# Patient Record
Sex: FEMALE | Race: WHITE | Employment: OTHER | URBAN - NONMETROPOLITAN AREA
[De-identification: names, ages, dates, MRNs, and addresses within clinical notes are randomized per-mention and may not be internally consistent; named-entity substitution may affect disease eponyms.]

---

## 2017-12-05 LAB — MAMMOGRAPHY, EXTERNAL: NORMAL

## 2020-11-03 PROBLEM — I10 HYPERTENSION: Status: RESOLVED | Noted: 2020-11-03 | Resolved: 2020-11-03

## 2024-02-23 ENCOUNTER — OFFICE VISIT (OUTPATIENT)
Dept: FAMILY MEDICINE CLINIC | Age: 64
End: 2024-02-23
Payer: MEDICARE

## 2024-02-23 VITALS
WEIGHT: 161 LBS | DIASTOLIC BLOOD PRESSURE: 98 MMHG | TEMPERATURE: 97.1 F | HEART RATE: 83 BPM | RESPIRATION RATE: 18 BRPM | BODY MASS INDEX: 24.4 KG/M2 | HEIGHT: 68 IN | OXYGEN SATURATION: 95 % | SYSTOLIC BLOOD PRESSURE: 170 MMHG

## 2024-02-23 DIAGNOSIS — E78.00 HYPERCHOLESTEROLEMIA: ICD-10-CM

## 2024-02-23 DIAGNOSIS — R73.03 PREDIABETES: ICD-10-CM

## 2024-02-23 DIAGNOSIS — I10 ESSENTIAL HYPERTENSION: Primary | ICD-10-CM

## 2024-02-23 DIAGNOSIS — I10 ESSENTIAL HYPERTENSION: ICD-10-CM

## 2024-02-23 PROBLEM — E55.9 VITAMIN D DEFICIENCY: Status: ACTIVE | Noted: 2023-10-29

## 2024-02-23 LAB
ALBUMIN SERPL-MCNC: 4.4 G/DL (ref 3.5–5.2)
ALP BLD-CCNC: 62 U/L (ref 35–104)
ALT SERPL-CCNC: 38 U/L (ref 0–32)
ANION GAP SERPL CALCULATED.3IONS-SCNC: 14 MMOL/L (ref 7–16)
AST SERPL-CCNC: 35 U/L (ref 0–31)
BILIRUB SERPL-MCNC: 0.3 MG/DL (ref 0–1.2)
BUN BLDV-MCNC: 16 MG/DL (ref 6–23)
CALCIUM SERPL-MCNC: 9.6 MG/DL (ref 8.6–10.2)
CHLORIDE BLD-SCNC: 99 MMOL/L (ref 98–107)
CHOLESTEROL: 217 MG/DL
CO2: 24 MMOL/L (ref 22–29)
CREAT SERPL-MCNC: 0.7 MG/DL (ref 0.5–1)
GFR SERPL CREATININE-BSD FRML MDRD: >60 ML/MIN/1.73M2
GLUCOSE BLD-MCNC: 97 MG/DL (ref 74–99)
HBA1C MFR BLD: 5.6 % (ref 4–5.6)
HCT VFR BLD CALC: 42.7 % (ref 34–48)
HDLC SERPL-MCNC: 49 MG/DL
HEMOGLOBIN: 14.2 G/DL (ref 11.5–15.5)
LDL CHOLESTEROL: 139 MG/DL
MCH RBC QN AUTO: 32.9 PG (ref 26–35)
MCHC RBC AUTO-ENTMCNC: 33.3 G/DL (ref 32–34.5)
MCV RBC AUTO: 98.8 FL (ref 80–99.9)
PDW BLD-RTO: 12.6 % (ref 11.5–15)
PLATELET # BLD: 248 K/UL (ref 130–450)
PMV BLD AUTO: 11.4 FL (ref 7–12)
POTASSIUM SERPL-SCNC: 4.3 MMOL/L (ref 3.5–5)
RBC # BLD: 4.32 M/UL (ref 3.5–5.5)
SODIUM BLD-SCNC: 137 MMOL/L (ref 132–146)
TOTAL PROTEIN: 7.3 G/DL (ref 6.4–8.3)
TRIGL SERPL-MCNC: 143 MG/DL
VLDLC SERPL CALC-MCNC: 29 MG/DL
WBC # BLD: 6 K/UL (ref 4.5–11.5)

## 2024-02-23 PROCEDURE — 99204 OFFICE O/P NEW MOD 45 MIN: CPT | Performed by: STUDENT IN AN ORGANIZED HEALTH CARE EDUCATION/TRAINING PROGRAM

## 2024-02-23 PROCEDURE — 1123F ACP DISCUSS/DSCN MKR DOCD: CPT | Performed by: STUDENT IN AN ORGANIZED HEALTH CARE EDUCATION/TRAINING PROGRAM

## 2024-02-23 PROCEDURE — 3080F DIAST BP >= 90 MM HG: CPT | Performed by: STUDENT IN AN ORGANIZED HEALTH CARE EDUCATION/TRAINING PROGRAM

## 2024-02-23 PROCEDURE — 3077F SYST BP >= 140 MM HG: CPT | Performed by: STUDENT IN AN ORGANIZED HEALTH CARE EDUCATION/TRAINING PROGRAM

## 2024-02-23 RX ORDER — FLUTICASONE PROPIONATE 50 MCG
1 SPRAY, SUSPENSION (ML) NASAL DAILY
COMMUNITY

## 2024-02-23 RX ORDER — HYDROCHLOROTHIAZIDE 25 MG/1
25 TABLET ORAL EVERY MORNING
Qty: 30 TABLET | Refills: 1 | Status: SHIPPED | OUTPATIENT
Start: 2024-02-23

## 2024-02-23 RX ORDER — BENAZEPRIL/HYDROCHLOROTHIAZIDE 20 MG-25MG
TABLET ORAL
COMMUNITY
End: 2024-02-23

## 2024-02-23 RX ORDER — LOSARTAN POTASSIUM 50 MG/1
50 TABLET ORAL DAILY
Qty: 30 TABLET | Refills: 2 | Status: SHIPPED | OUTPATIENT
Start: 2024-02-23

## 2024-02-23 RX ORDER — CELECOXIB 100 MG/1
CAPSULE ORAL
COMMUNITY

## 2024-02-23 RX ORDER — EPINEPHRINE 0.15 MG/.15ML
INJECTION SUBCUTANEOUS
COMMUNITY
Start: 2016-09-29

## 2024-02-23 SDOH — ECONOMIC STABILITY: FOOD INSECURITY: WITHIN THE PAST 12 MONTHS, YOU WORRIED THAT YOUR FOOD WOULD RUN OUT BEFORE YOU GOT MONEY TO BUY MORE.: NEVER TRUE

## 2024-02-23 SDOH — ECONOMIC STABILITY: INCOME INSECURITY: HOW HARD IS IT FOR YOU TO PAY FOR THE VERY BASICS LIKE FOOD, HOUSING, MEDICAL CARE, AND HEATING?: NOT VERY HARD

## 2024-02-23 SDOH — ECONOMIC STABILITY: HOUSING INSECURITY
IN THE LAST 12 MONTHS, WAS THERE A TIME WHEN YOU DID NOT HAVE A STEADY PLACE TO SLEEP OR SLEPT IN A SHELTER (INCLUDING NOW)?: NO

## 2024-02-23 SDOH — ECONOMIC STABILITY: FOOD INSECURITY: WITHIN THE PAST 12 MONTHS, THE FOOD YOU BOUGHT JUST DIDN'T LAST AND YOU DIDN'T HAVE MONEY TO GET MORE.: NEVER TRUE

## 2024-02-23 ASSESSMENT — PATIENT HEALTH QUESTIONNAIRE - PHQ9
SUM OF ALL RESPONSES TO PHQ9 QUESTIONS 1 & 2: 0
SUM OF ALL RESPONSES TO PHQ QUESTIONS 1-9: 0
SUM OF ALL RESPONSES TO PHQ QUESTIONS 1-9: 0
2. FEELING DOWN, DEPRESSED OR HOPELESS: 0
SUM OF ALL RESPONSES TO PHQ QUESTIONS 1-9: 0
1. LITTLE INTEREST OR PLEASURE IN DOING THINGS: 0
SUM OF ALL RESPONSES TO PHQ QUESTIONS 1-9: 0

## 2024-02-23 ASSESSMENT — ANXIETY QUESTIONNAIRES
5. BEING SO RESTLESS THAT IT IS HARD TO SIT STILL: NOT AT ALL
6. BECOMING EASILY ANNOYED OR IRRITABLE: 0
6. BECOMING EASILY ANNOYED OR IRRITABLE: NOT AT ALL
4. TROUBLE RELAXING: NOT AT ALL
IF YOU CHECKED OFF ANY PROBLEMS ON THIS QUESTIONNAIRE, HOW DIFFICULT HAVE THESE PROBLEMS MADE IT FOR YOU TO DO YOUR WORK, TAKE CARE OF THINGS AT HOME, OR GET ALONG WITH OTHER PEOPLE: NOT DIFFICULT AT ALL
7. FEELING AFRAID AS IF SOMETHING AWFUL MIGHT HAPPEN: NOT AT ALL
1. FEELING NERVOUS, ANXIOUS, OR ON EDGE: 0
3. WORRYING TOO MUCH ABOUT DIFFERENT THINGS: NOT AT ALL
5. BEING SO RESTLESS THAT IT IS HARD TO SIT STILL: 0
2. NOT BEING ABLE TO STOP OR CONTROL WORRYING: NOT AT ALL
1. FEELING NERVOUS, ANXIOUS, OR ON EDGE: NOT AT ALL
7. FEELING AFRAID AS IF SOMETHING AWFUL MIGHT HAPPEN: 0
IF YOU CHECKED OFF ANY PROBLEMS ON THIS QUESTIONNAIRE, HOW DIFFICULT HAVE THESE PROBLEMS MADE IT FOR YOU TO DO YOUR WORK, TAKE CARE OF THINGS AT HOME, OR GET ALONG WITH OTHER PEOPLE: NOT DIFFICULT AT ALL
GAD7 TOTAL SCORE: 0
2. NOT BEING ABLE TO STOP OR CONTROL WORRYING: 0
3. WORRYING TOO MUCH ABOUT DIFFERENT THINGS: 0
4. TROUBLE RELAXING: 0

## 2024-02-23 ASSESSMENT — LIFESTYLE VARIABLES
HOW OFTEN DO YOU HAVE SIX OR MORE DRINKS ON ONE OCCASION: 1
HOW MANY STANDARD DRINKS CONTAINING ALCOHOL DO YOU HAVE ON A TYPICAL DAY: PATIENT DOES NOT DRINK
HOW OFTEN DO YOU HAVE A DRINK CONTAINING ALCOHOL: NEVER
HOW OFTEN DO YOU HAVE A DRINK CONTAINING ALCOHOL: 1
HOW MANY STANDARD DRINKS CONTAINING ALCOHOL DO YOU HAVE ON A TYPICAL DAY: 0

## 2024-02-23 NOTE — PROGRESS NOTES
[facility] [place] [facility] PRIMARY CARE  [address] [place] [address]  Dept: [phone]  Dept Fax: [phone]   DATE OF VISIT : 2024      Patient:  Carolann Gomes  Age: 63 y.o.       : 1960      Chief complaint:   Chief Complaint   Patient presents with    Establish [facility]    Allergic Reaction     BP medicine - taking for about 40 years, started with manageable hives/rash, but has gotten much worse          History of Present Illness     Carolann Gomse is a 63 y.o. female who presented to the clinic today to establish care.    Patient has a past medical history of hypertension, GERD, hyperlipidemia, and prediabetes.  She reports experiencing hives for almost 2 years now with a possibility from her blood pressure medication.  She reports previously being seen at urgent care for the symptoms and was first instructed to change all shampoos, lotions, detergents, creams, laundry detergents, etc.  Patient reports changing everything and continues to have all of the symptoms.  They then attempted to change her amlodipine-benazepril medication to benazepril-hydrochlorothiazide to no alleviation.  At this point patient continues to have diffuse erythematous rash.  She reports still having steroid cream that she applies intermittently when rash becomes significantly worse.  She denies rash becoming worse with any particular food intake and denies any shortness of breath, chest pain, or abdominal pain.  For her hyperlipidemia patient reports not having blood work done for some time.  She denies taking any medications for cholesterol.  Overall does report a well-balanced diet.  She additionally has medical records that states that she is prediabetic.  Hemoglobin A1c has never been checked.  Denies any headaches or dizziness.    Medication List:    Current Outpatient Medications   Medication Sig Dispense Refill    celecoxib (CELEBREX) 100 MG capsule Take 1

## 2024-02-28 DIAGNOSIS — E78.2 MIXED HYPERLIPIDEMIA: Primary | ICD-10-CM

## 2024-02-28 RX ORDER — ATORVASTATIN CALCIUM 20 MG/1
20 TABLET, FILM COATED ORAL DAILY
Qty: 90 TABLET | Refills: 1 | Status: SHIPPED | OUTPATIENT
Start: 2024-02-28

## 2024-03-08 ENCOUNTER — OFFICE VISIT (OUTPATIENT)
Dept: FAMILY MEDICINE CLINIC | Age: 64
End: 2024-03-08
Payer: MEDICARE

## 2024-03-08 VITALS
TEMPERATURE: 98.2 F | BODY MASS INDEX: 24.55 KG/M2 | SYSTOLIC BLOOD PRESSURE: 136 MMHG | OXYGEN SATURATION: 99 % | WEIGHT: 162 LBS | HEART RATE: 93 BPM | HEIGHT: 68 IN | DIASTOLIC BLOOD PRESSURE: 88 MMHG

## 2024-03-08 DIAGNOSIS — I10 ESSENTIAL HYPERTENSION: ICD-10-CM

## 2024-03-08 PROCEDURE — 1123F ACP DISCUSS/DSCN MKR DOCD: CPT | Performed by: STUDENT IN AN ORGANIZED HEALTH CARE EDUCATION/TRAINING PROGRAM

## 2024-03-08 PROCEDURE — 3079F DIAST BP 80-89 MM HG: CPT | Performed by: STUDENT IN AN ORGANIZED HEALTH CARE EDUCATION/TRAINING PROGRAM

## 2024-03-08 PROCEDURE — 3075F SYST BP GE 130 - 139MM HG: CPT | Performed by: STUDENT IN AN ORGANIZED HEALTH CARE EDUCATION/TRAINING PROGRAM

## 2024-03-08 PROCEDURE — 99213 OFFICE O/P EST LOW 20 MIN: CPT | Performed by: STUDENT IN AN ORGANIZED HEALTH CARE EDUCATION/TRAINING PROGRAM

## 2024-03-08 RX ORDER — LOSARTAN POTASSIUM 100 MG/1
100 TABLET ORAL DAILY
Qty: 30 TABLET | Refills: 2 | Status: SHIPPED | OUTPATIENT
Start: 2024-03-08

## 2024-03-18 ENCOUNTER — TELEPHONE (OUTPATIENT)
Dept: FAMILY MEDICINE CLINIC | Age: 64
End: 2024-03-18

## 2024-03-18 DIAGNOSIS — Z12.31 ENCOUNTER FOR SCREENING MAMMOGRAM FOR MALIGNANT NEOPLASM OF BREAST: Primary | ICD-10-CM

## 2024-03-18 RX ORDER — AMLODIPINE BESYLATE 5 MG/1
5 TABLET ORAL DAILY
Qty: 30 TABLET | Refills: 3 | Status: SHIPPED
Start: 2024-03-18 | End: 2024-03-19 | Stop reason: SDUPTHER

## 2024-03-18 NOTE — TELEPHONE ENCOUNTER
I called the patient to see if she would be interested in having a mammogram order placed and scheduled.  She said she would be interested in this and wants it scheduled for here. She said her last one was done at [facility]. Her last one was in 2017.    Can you place mammogram order?       I also requested that Eugenio fax us her last mammogram to [phone].

## 2024-03-18 NOTE — TELEPHONE ENCOUNTER
Pt is calling in for her losartan that she doesn't feel that it is working, 160/90 BP at home. She feels okay, but she wants to know if you need to raise her dose.

## 2024-03-19 RX ORDER — AMLODIPINE BESYLATE 5 MG/1
5 TABLET ORAL DAILY
Qty: 90 TABLET | Refills: 0 | Status: SHIPPED | OUTPATIENT
Start: 2024-03-19 | End: 2024-06-17

## 2024-04-05 ENCOUNTER — OFFICE VISIT (OUTPATIENT)
Dept: FAMILY MEDICINE CLINIC | Age: 64
End: 2024-04-05
Payer: MEDICARE

## 2024-04-05 VITALS
HEART RATE: 114 BPM | BODY MASS INDEX: 24.55 KG/M2 | TEMPERATURE: 98.1 F | OXYGEN SATURATION: 98 % | SYSTOLIC BLOOD PRESSURE: 138 MMHG | WEIGHT: 162 LBS | HEIGHT: 68 IN | DIASTOLIC BLOOD PRESSURE: 86 MMHG

## 2024-04-05 DIAGNOSIS — I10 ESSENTIAL HYPERTENSION: ICD-10-CM

## 2024-04-05 PROCEDURE — 3075F SYST BP GE 130 - 139MM HG: CPT | Performed by: STUDENT IN AN ORGANIZED HEALTH CARE EDUCATION/TRAINING PROGRAM

## 2024-04-05 PROCEDURE — 1123F ACP DISCUSS/DSCN MKR DOCD: CPT | Performed by: STUDENT IN AN ORGANIZED HEALTH CARE EDUCATION/TRAINING PROGRAM

## 2024-04-05 PROCEDURE — 99213 OFFICE O/P EST LOW 20 MIN: CPT | Performed by: STUDENT IN AN ORGANIZED HEALTH CARE EDUCATION/TRAINING PROGRAM

## 2024-04-05 PROCEDURE — 3079F DIAST BP 80-89 MM HG: CPT | Performed by: STUDENT IN AN ORGANIZED HEALTH CARE EDUCATION/TRAINING PROGRAM

## 2024-04-05 RX ORDER — LOSARTAN POTASSIUM 50 MG/1
50 TABLET ORAL DAILY
Qty: 30 TABLET | Refills: 4 | Status: SHIPPED | OUTPATIENT
Start: 2024-04-05

## 2024-04-05 RX ORDER — LOSARTAN POTASSIUM 50 MG/1
50 TABLET ORAL DAILY
Qty: 90 TABLET | Refills: 4 | OUTPATIENT
Start: 2024-04-05

## 2024-04-05 RX ORDER — CLONIDINE HYDROCHLORIDE 0.2 MG/1
0.2 TABLET ORAL 2 TIMES DAILY
Qty: 60 TABLET | Refills: 2 | Status: SHIPPED | OUTPATIENT
Start: 2024-04-05 | End: 2024-07-04

## 2024-04-05 NOTE — ASSESSMENT & PLAN NOTE
Stable at this time but requires medication changes due to headaches. Discontinue amlodipine. Continue losartan 50 mg daily.  Will add clonidine 0.2 mg twice daily.  Follow-up in 6 weeks for blood pressure check.  Advised to call if she begins to have any symptoms or worsening blood pressure.  Advised to check blood pressure on a daily basis.

## 2024-05-17 ENCOUNTER — OFFICE VISIT (OUTPATIENT)
Dept: FAMILY MEDICINE CLINIC | Age: 64
End: 2024-05-17
Payer: MEDICARE

## 2024-05-17 VITALS
OXYGEN SATURATION: 99 % | HEIGHT: 68 IN | SYSTOLIC BLOOD PRESSURE: 158 MMHG | DIASTOLIC BLOOD PRESSURE: 98 MMHG | WEIGHT: 165 LBS | RESPIRATION RATE: 18 BRPM | HEART RATE: 74 BPM | TEMPERATURE: 97.4 F | BODY MASS INDEX: 25.01 KG/M2

## 2024-05-17 DIAGNOSIS — Z91.030 BEE STING ALLERGY: ICD-10-CM

## 2024-05-17 DIAGNOSIS — I10 ESSENTIAL HYPERTENSION: Primary | ICD-10-CM

## 2024-05-17 PROCEDURE — 1123F ACP DISCUSS/DSCN MKR DOCD: CPT | Performed by: STUDENT IN AN ORGANIZED HEALTH CARE EDUCATION/TRAINING PROGRAM

## 2024-05-17 PROCEDURE — 99214 OFFICE O/P EST MOD 30 MIN: CPT | Performed by: STUDENT IN AN ORGANIZED HEALTH CARE EDUCATION/TRAINING PROGRAM

## 2024-05-17 PROCEDURE — 3080F DIAST BP >= 90 MM HG: CPT | Performed by: STUDENT IN AN ORGANIZED HEALTH CARE EDUCATION/TRAINING PROGRAM

## 2024-05-17 PROCEDURE — 3077F SYST BP >= 140 MM HG: CPT | Performed by: STUDENT IN AN ORGANIZED HEALTH CARE EDUCATION/TRAINING PROGRAM

## 2024-05-17 RX ORDER — LOSARTAN POTASSIUM 100 MG/1
100 TABLET ORAL DAILY
Qty: 30 TABLET | Refills: 5 | Status: SHIPPED | OUTPATIENT
Start: 2024-05-17 | End: 2024-11-13

## 2024-05-17 RX ORDER — EPINEPHRINE 0.3 MG/.3ML
0.3 INJECTION SUBCUTANEOUS ONCE AS NEEDED
Qty: 0.6 ML | Refills: 1 | Status: SHIPPED | OUTPATIENT
Start: 2024-05-17

## 2024-05-17 RX ORDER — EPINEPHRINE 0.15 MG/.15ML
INJECTION SUBCUTANEOUS
Status: CANCELLED | OUTPATIENT
Start: 2024-05-17

## 2024-05-17 RX ORDER — CELECOXIB 100 MG/1
CAPSULE ORAL
Qty: 60 CAPSULE | Refills: 2 | Status: SHIPPED | OUTPATIENT
Start: 2024-05-17

## 2024-05-17 NOTE — PROGRESS NOTES
[facility] [place] [facility] CARE  [address] [place] [address]  Dept: [phone]  Dept Fax: [phone]   DATE OF VISIT : 2024      Patient:  Carolann Gomes  Age: 63 y.o.       : 1960      Chief complaint:   Chief Complaint   Patient presents with    Hypertension         History of Present Illness     Carolann Gomes is a 63 y.o. female who presented to the clinic today for hypertension    Patient presents today for hypertension follow-up.  She has had several reactions to different medications in the past including  amlodipine-benazepril, benazepril - hydrochlorothiazide.  Patient was then attempted to switch to losartan and was discontinued from HCTZ.  Previous reactions from HCTZ includes hives.  Patient was unable to tolerate losartan 100 mg and was then decreased to 50 mg.  She was then given amlodipine but was unable to tolerate it due to neck stiffness and headaches.  She denies any nausea, emesis, chest pain or shortness of breath.    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    Medication List:    Current Outpatient Medications   Medication Sig Dispense Refill    celecoxib (CELEBREX) 100 MG capsule Take 1 capsule every day by oral route for 30 days. 60 capsule 2    losartan (COZAAR) 100 MG tablet Take 1 tablet by mouth daily 30 tablet 5    EPINEPHrine (EPIPEN 2-KELSEY) 0.3 MG/0.3ML SOAJ injection Inject 0.3 mLs into the muscle 1 (one) time if needed (allergic reaction to bee.) Use as directed for allergic reaction 0.6 mL 1    cloNIDine (CATAPRES) 0.2 MG tablet Take 1 tablet by mouth 2 times daily 60 tablet 2    atorvastatin (LIPITOR) 20 MG tablet Take 1 tablet by mouth daily 90 tablet 1    fluticasone (FLONASE) 50 MCG/ACT nasal spray 1 spray by Each Nostril route daily      vitamin D (VITAMIN D3) 250 MCG (79708 UT) CAPS capsule Take 1 capsule by mouth daily      NONFORMULARY

## 2024-05-29 DIAGNOSIS — E78.2 MIXED HYPERLIPIDEMIA: ICD-10-CM

## 2024-05-30 RX ORDER — ATORVASTATIN CALCIUM 20 MG/1
20 TABLET, FILM COATED ORAL DAILY
Qty: 90 TABLET | Refills: 1 | Status: SHIPPED | OUTPATIENT
Start: 2024-05-30

## 2024-05-30 NOTE — TELEPHONE ENCOUNTER
Last Appointment:  5/17/2024    Future appts:  Future Appointments   Date Time Provider Department Center   7/17/2024 10:15 AM Allan Layne MD [facility]

## 2024-06-24 DIAGNOSIS — I10 ESSENTIAL HYPERTENSION: ICD-10-CM

## 2024-06-24 NOTE — TELEPHONE ENCOUNTER
Last Appointment:  5/17/2024  Future Appointments   Date Time Provider Department Center   7/17/2024 10:15 [facility]       Yes...

## 2024-06-25 RX ORDER — CLONIDINE HYDROCHLORIDE 0.2 MG/1
0.2 TABLET ORAL 2 TIMES DAILY
Qty: 180 TABLET | Refills: 3 | Status: SHIPPED | OUTPATIENT
Start: 2024-06-25

## 2024-06-27 ENCOUNTER — OFFICE VISIT (OUTPATIENT)
Dept: FAMILY MEDICINE CLINIC | Age: 64
End: 2024-06-27
Payer: MEDICARE

## 2024-06-27 VITALS
WEIGHT: 164 LBS | TEMPERATURE: 97.8 F | HEIGHT: 68 IN | OXYGEN SATURATION: 100 % | RESPIRATION RATE: 16 BRPM | HEART RATE: 86 BPM | BODY MASS INDEX: 24.86 KG/M2 | SYSTOLIC BLOOD PRESSURE: 134 MMHG | DIASTOLIC BLOOD PRESSURE: 80 MMHG

## 2024-06-27 DIAGNOSIS — R07.81 RIB PAIN ON LEFT SIDE: Primary | ICD-10-CM

## 2024-06-27 PROCEDURE — 1123F ACP DISCUSS/DSCN MKR DOCD: CPT | Performed by: PHYSICIAN ASSISTANT

## 2024-06-27 PROCEDURE — 99213 OFFICE O/P EST LOW 20 MIN: CPT | Performed by: PHYSICIAN ASSISTANT

## 2024-06-27 PROCEDURE — 3075F SYST BP GE 130 - 139MM HG: CPT | Performed by: PHYSICIAN ASSISTANT

## 2024-06-27 PROCEDURE — 3079F DIAST BP 80-89 MM HG: CPT | Performed by: PHYSICIAN ASSISTANT

## 2024-06-27 NOTE — PROGRESS NOTES
Chief Complaint   Fall (L sided rib pain, fell on monday)      History of Present Illness   Source of history provided by:  patient.      Carolann Gomes is a 63 y.o. old female presenting to the walk in clinic for evaluation of left lower anterior rib pain which has been present for the past 4 days.  Patient reports that she fell forward onto grass which precipitated her symptoms.  She denies hitting her head during this fall.  Denies any LOC.  Patient reports that the pain is exacerbated with direct palpation of the site as well as deep breathing.  Denies any associated chest pain or dyspnea.  Denies any abdominal pain, hematuria, N/V/D, fever, chills, HA, neck pain, recent illness, dysuria, or lethargy.    ROS    Unless otherwise stated in this report or unable to obtain because of the patient's clinical or mental status as evidenced by the medical record, this patients's positive and negative responses for Review of Systems, constitutional, psych, eyes, ENT, cardiovascular, respiratory, gastrointestinal, neurological, genitourinary, musculoskeletal, integument systems and systems related to the presenting problem are either stated in the preceding or were not pertinent or were negative for the symptoms and/or complaints related to the medical problem.    Past Medical History:  has a past medical history of GERD (gastroesophageal reflux disease) and Hypertension.  Past Surgical History:  has a past surgical history that includes Cholecystectomy; back surgery; and  section.  Social History:  reports that she quit smoking about 38 years ago. Her smoking use included cigarettes. She started smoking about 58 years ago. She has a 60.0 pack-year smoking history. She has never used smokeless tobacco. She reports that she does not currently use alcohol. She reports current drug use. Drug: Marijuana (Weed).  Family History: family history includes Breast Cancer in her paternal aunt; Cancer (age of onset:

## 2024-07-17 ENCOUNTER — OFFICE VISIT (OUTPATIENT)
Dept: FAMILY MEDICINE CLINIC | Age: 64
End: 2024-07-17
Payer: MEDICARE

## 2024-07-17 VITALS
OXYGEN SATURATION: 99 % | BODY MASS INDEX: 24.71 KG/M2 | HEART RATE: 79 BPM | TEMPERATURE: 97.6 F | RESPIRATION RATE: 18 BRPM | HEIGHT: 68 IN | WEIGHT: 163 LBS | SYSTOLIC BLOOD PRESSURE: 178 MMHG | DIASTOLIC BLOOD PRESSURE: 108 MMHG

## 2024-07-17 DIAGNOSIS — I10 ESSENTIAL HYPERTENSION: Primary | ICD-10-CM

## 2024-07-17 PROCEDURE — 1123F ACP DISCUSS/DSCN MKR DOCD: CPT | Performed by: STUDENT IN AN ORGANIZED HEALTH CARE EDUCATION/TRAINING PROGRAM

## 2024-07-17 PROCEDURE — 3077F SYST BP >= 140 MM HG: CPT | Performed by: STUDENT IN AN ORGANIZED HEALTH CARE EDUCATION/TRAINING PROGRAM

## 2024-07-17 PROCEDURE — 99213 OFFICE O/P EST LOW 20 MIN: CPT | Performed by: STUDENT IN AN ORGANIZED HEALTH CARE EDUCATION/TRAINING PROGRAM

## 2024-07-17 PROCEDURE — 3080F DIAST BP >= 90 MM HG: CPT | Performed by: STUDENT IN AN ORGANIZED HEALTH CARE EDUCATION/TRAINING PROGRAM

## 2024-07-17 RX ORDER — METOPROLOL SUCCINATE 50 MG/1
50 TABLET, EXTENDED RELEASE ORAL DAILY
Qty: 90 TABLET | Refills: 1 | Status: SHIPPED | OUTPATIENT
Start: 2024-07-17

## 2024-07-17 NOTE — PROGRESS NOTES
capsule by mouth daily. 30 capsule 0     No current facility-administered medications for this visit.            ROS   Reviewed as above, otherwise negative       Physical Exam   Vitals:   Vitals:    07/17/24 1026   BP: (!) 178/108   Pulse:    Resp:    Temp:    SpO2:        Physical Exam  Vitals reviewed.   Constitutional:       Appearance: Normal appearance.   HENT:      Head: Normocephalic and atraumatic.   Cardiovascular:      Rate and Rhythm: Normal rate and regular rhythm.      Pulses: Normal pulses.      Heart sounds: Normal heart sounds.   Pulmonary:      Effort: Pulmonary effort is normal.      Breath sounds: Normal breath sounds.   Neurological:      Mental Status: She is alert.   Psychiatric:         Mood and Affect: Mood normal.         Behavior: Behavior normal.           Assessment and Plan       1. Essential hypertension  Comments:  Unstable.  Discontinue clonidine.  Will add metoprolol 50 mg daily and continue losartan 100 mg daily.  Orders:  -     metoprolol succinate (TOPROL XL) 50 MG extended release tablet; Take 1 tablet by mouth daily, Disp-90 tablet, R-1Normal       Return in about 6 weeks (around 8/28/2024) for Hypertension.    This note may have been created using dictation software. Efforts were made to reduce grammatical or syntax errors, but some may persist.     Counseled regarding above diagnosis, including possible risks and complications, especially if left uncontrolled. Counseled regarding the possible side effects, risks, benefits and alternatives to treatment; patient and/or guardian verbalizes understanding, agrees, feels comfortable with, and wishes to proceed with above treatment plan.     Call or go to ED immediately if symptoms worsen or persist. Advised patient to call with any new medication issues and, as applicable, read all Rx info from pharmacy to assure aware of all possible risks and side effects of medication before taking.     Patient and/or guardian given opportunity

## 2024-07-22 ENCOUNTER — TELEPHONE (OUTPATIENT)
Dept: FAMILY MEDICINE CLINIC | Age: 64
End: 2024-07-22

## 2024-07-22 NOTE — TELEPHONE ENCOUNTER
Carolann is calling about the Metoprolol.  She is stopping it today because of the side effects.    She has swollen feet and a stiff neck. It gave her a bad headache and hives on her upper body.

## 2024-07-23 ENCOUNTER — TELEPHONE (OUTPATIENT)
Dept: FAMILY MEDICINE CLINIC | Age: 64
End: 2024-07-23

## 2024-07-23 NOTE — TELEPHONE ENCOUNTER
Patient called in again today and was wondering what you would like her to do as far as the metoprolol goes? She called in yesterday and stated that she stopped medication due to side effects. She had swollen feet and a stiff neck and hives on upper body.

## 2024-07-23 NOTE — TELEPHONE ENCOUNTER
Please advise patient to seek medical attention at nearest emergency room given her symptoms to appear severe which include stiff neck and hives.  We can discuss adding additional medications such as hydralazine once she recovers from her acute allergic reaction.

## 2024-07-23 NOTE — TELEPHONE ENCOUNTER
Patient was informed. She said she hasn't taken the medication fr 2 days. She said the hives, swollen feet, and stiff neck are subsiding. She said they are almost gone. Sunday she took it and it was 168/127. Her blood pressure has come down some its still on the higher side but its come down since stopping the medication.

## 2024-08-08 DIAGNOSIS — I10 ESSENTIAL HYPERTENSION: ICD-10-CM

## 2024-08-08 RX ORDER — LOSARTAN POTASSIUM 100 MG/1
100 TABLET ORAL DAILY
Qty: 30 TABLET | Refills: 5 | Status: SHIPPED | OUTPATIENT
Start: 2024-08-08 | End: 2025-02-04

## 2024-08-28 ENCOUNTER — OFFICE VISIT (OUTPATIENT)
Dept: FAMILY MEDICINE CLINIC | Age: 64
End: 2024-08-28
Payer: MEDICARE

## 2024-08-28 VITALS
TEMPERATURE: 97.7 F | WEIGHT: 163.6 LBS | HEIGHT: 68 IN | OXYGEN SATURATION: 97 % | DIASTOLIC BLOOD PRESSURE: 90 MMHG | SYSTOLIC BLOOD PRESSURE: 142 MMHG | HEART RATE: 85 BPM | BODY MASS INDEX: 24.8 KG/M2

## 2024-08-28 DIAGNOSIS — E78.2 MIXED HYPERLIPIDEMIA: ICD-10-CM

## 2024-08-28 DIAGNOSIS — I10 ESSENTIAL HYPERTENSION: Primary | ICD-10-CM

## 2024-08-28 PROCEDURE — 1123F ACP DISCUSS/DSCN MKR DOCD: CPT | Performed by: STUDENT IN AN ORGANIZED HEALTH CARE EDUCATION/TRAINING PROGRAM

## 2024-08-28 PROCEDURE — 3080F DIAST BP >= 90 MM HG: CPT | Performed by: STUDENT IN AN ORGANIZED HEALTH CARE EDUCATION/TRAINING PROGRAM

## 2024-08-28 PROCEDURE — 99214 OFFICE O/P EST MOD 30 MIN: CPT | Performed by: STUDENT IN AN ORGANIZED HEALTH CARE EDUCATION/TRAINING PROGRAM

## 2024-08-28 PROCEDURE — 3077F SYST BP >= 140 MM HG: CPT | Performed by: STUDENT IN AN ORGANIZED HEALTH CARE EDUCATION/TRAINING PROGRAM

## 2024-08-28 RX ORDER — ATORVASTATIN CALCIUM 20 MG/1
20 TABLET, FILM COATED ORAL DAILY
Qty: 90 TABLET | Refills: 1 | Status: SHIPPED | OUTPATIENT
Start: 2024-08-28

## 2024-08-28 RX ORDER — CLONIDINE HYDROCHLORIDE 0.2 MG/1
0.2 TABLET ORAL 3 TIMES DAILY
Qty: 270 TABLET | Refills: 1 | Status: SHIPPED | OUTPATIENT
Start: 2024-08-28 | End: 2025-02-24

## 2024-08-28 NOTE — PROGRESS NOTES
[facility] [place] [facility] PRIMARY CARE  [address] [place] [address]  Dept: [phone]  Dept Fax: [phone]   DATE OF VISIT : 2024      Patient:  Carolann Gomes  Age: 64 y.o.       : 1960      Chief complaint:   Chief Complaint   Patient presents with    Follow-up         History of Present Illness     Carolann Gomes is a 64 y.o. female who presented to the clinic today for hypertension    Since her last office visit patient has continued to take her losartan and has restarted her clonidine 0.2 mg but 3 times daily.  She denies any worsening somnolence.  She denies any headaches, chest pain or shortness of breath.  Patient has tolerated medication well reports blood pressure decreasing since restarting her medications.    Additionally patient continues to take atorvastatin as prescribed.  She denies any significant weight changes since her last office visit.  Does continue to have well-balanced diet.    Medication List:    Current Outpatient Medications   Medication Sig Dispense Refill    cloNIDine (CATAPRES) 0.2 MG tablet Take 1 tablet by mouth in the morning, at noon, and at bedtime 270 tablet 1    atorvastatin (LIPITOR) 20 MG tablet Take 1 tablet by mouth daily 90 tablet 1    losartan (COZAAR) 100 MG tablet Take 1 tablet by mouth daily 30 tablet 5    celecoxib (CELEBREX) 100 MG capsule Take 1 capsule every day by oral route for 30 days. 60 capsule 2    EPINEPHrine (EPIPEN 2-KELSEY) 0.3 MG/0.3ML SOAJ injection Inject 0.3 mLs into the muscle 1 (one) time if needed (allergic reaction to bee.) Use as directed for allergic reaction 0.6 mL 1    fluticasone (FLONASE) 50 MCG/ACT nasal spray 1 spray by Each Nostril route daily      vitamin D (VITAMIN D3) 250 MCG (43255 UT) CAPS capsule Take 1 capsule by mouth daily      EPINEPHrine (ADRENACLICK) 0.15 MG/0.15ML SOAJ injection       omeprazole (PRILOSEC) 40 MG capsule Take 1 capsule by mouth daily. 30

## 2024-12-11 ENCOUNTER — OFFICE VISIT (OUTPATIENT)
Dept: FAMILY MEDICINE CLINIC | Age: 64
End: 2024-12-11

## 2024-12-11 VITALS
SYSTOLIC BLOOD PRESSURE: 130 MMHG | TEMPERATURE: 97.3 F | HEIGHT: 68 IN | DIASTOLIC BLOOD PRESSURE: 86 MMHG | WEIGHT: 166 LBS | BODY MASS INDEX: 25.16 KG/M2 | OXYGEN SATURATION: 97 % | HEART RATE: 83 BPM

## 2024-12-11 DIAGNOSIS — M25.511 CHRONIC RIGHT SHOULDER PAIN: ICD-10-CM

## 2024-12-11 DIAGNOSIS — M25.511 CHRONIC RIGHT SHOULDER PAIN: Primary | ICD-10-CM

## 2024-12-11 DIAGNOSIS — Z12.11 COLON CANCER SCREENING: ICD-10-CM

## 2024-12-11 DIAGNOSIS — G89.29 CHRONIC RIGHT SHOULDER PAIN: Primary | ICD-10-CM

## 2024-12-11 DIAGNOSIS — G89.29 CHRONIC RIGHT SHOULDER PAIN: ICD-10-CM

## 2024-12-11 DIAGNOSIS — Z23 NEED FOR IMMUNIZATION AGAINST INFLUENZA: Primary | ICD-10-CM

## 2024-12-11 DIAGNOSIS — I10 ESSENTIAL HYPERTENSION: ICD-10-CM

## 2024-12-11 RX ORDER — BACLOFEN 10 MG/1
10 TABLET ORAL 2 TIMES DAILY
Qty: 30 TABLET | Refills: 2 | Status: SHIPPED
Start: 2024-12-11 | End: 2024-12-11

## 2024-12-11 RX ORDER — BACLOFEN 10 MG/1
10 TABLET ORAL 2 TIMES DAILY
Qty: 30 TABLET | Refills: 2 | Status: SHIPPED | OUTPATIENT
Start: 2024-12-11

## 2024-12-11 NOTE — PROGRESS NOTES
[facility] CARE  [address] [place] [address]  Dept: [phone]  Dept Fax: [phone]   DATE OF VISIT : 2024      Patient:  Carolann Gomes  Age: 64 y.o.       : 1960      Chief complaint:   Chief Complaint   Patient presents with    Follow-up     HTN    Shoulder Injury     right    Pain         History of Present Illness     Carolann Gomes is a 64 y.o. female who presented to the clinic today for right shoulder pain and hypertension    Patient's overall blood pressure appears to be well-controlled on clonidine and losartan.  She denies any headaches, dizziness or blurry vision.  Additionally patient does continue to have right shoulder pain and discomfort.  Reports having a fall back in 2024.  Has not noticed any improvement in overall pain.  Now is experiencing weakness of her right upper extremity.  At times feels that she is unable to grasp items.  Denies any shooting pain down her arm.  Pain mainly localized at the right shoulder.    Medication List:    Current Outpatient Medications   Medication Sig Dispense Refill    baclofen (LIORESAL) 10 MG tablet Take 1 tablet by mouth 2 times daily 30 tablet 2    cloNIDine (CATAPRES) 0.2 MG tablet Take 1 tablet by mouth in the morning, at noon, and at bedtime 270 tablet 1    atorvastatin (LIPITOR) 20 MG tablet Take 1 tablet by mouth daily 90 tablet 1    losartan (COZAAR) 100 MG tablet Take 1 tablet by mouth daily 30 tablet 5    EPINEPHrine (EPIPEN 2-KELSEY) 0.3 MG/0.3ML SOAJ injection Inject 0.3 mLs into the muscle 1 (one) time if needed (allergic reaction to bee.) Use as directed for allergic reaction 0.6 mL 1    fluticasone (FLONASE) 50 MCG/ACT nasal spray 1 spray by Each Nostril route daily      vitamin D (VITAMIN D3) 250 MCG (30770 UT) CAPS capsule Take 1 capsule by mouth daily      omeprazole (PRILOSEC) 40 MG capsule Take 1 capsule by mouth daily. 30 capsule 0     No

## 2025-01-02 DIAGNOSIS — E78.2 MIXED HYPERLIPIDEMIA: ICD-10-CM

## 2025-01-02 DIAGNOSIS — M25.511 CHRONIC RIGHT SHOULDER PAIN: ICD-10-CM

## 2025-01-02 DIAGNOSIS — G89.29 CHRONIC RIGHT SHOULDER PAIN: ICD-10-CM

## 2025-01-02 DIAGNOSIS — I10 ESSENTIAL HYPERTENSION: ICD-10-CM

## 2025-01-02 NOTE — TELEPHONE ENCOUNTER
Patient's insurance has changed.  She needs new Rx's sent to [facility] in [place].  Orders pending.     Last Appointment:  12/11/2024  Future Appointments   Date Time Provider Department Center   1/10/2025  4:15 PM [facility]   2/14/2025  9:15 AM Swapnil Jones MD [facility]   3/12/2025 10:15 AM Allan Layne MD [facility] DEP

## 2025-01-03 RX ORDER — BACLOFEN 10 MG/1
10 TABLET ORAL 2 TIMES DAILY
Qty: 30 TABLET | Refills: 2 | Status: SHIPPED | OUTPATIENT
Start: 2025-01-03

## 2025-01-03 RX ORDER — ATORVASTATIN CALCIUM 20 MG/1
20 TABLET, FILM COATED ORAL DAILY
Qty: 90 TABLET | Refills: 1 | Status: SHIPPED | OUTPATIENT
Start: 2025-01-03

## 2025-01-03 RX ORDER — CLONIDINE HYDROCHLORIDE 0.2 MG/1
0.2 TABLET ORAL 3 TIMES DAILY
Qty: 270 TABLET | Refills: 1 | Status: SHIPPED | OUTPATIENT
Start: 2025-01-03 | End: 2025-07-02

## 2025-01-03 RX ORDER — LOSARTAN POTASSIUM 100 MG/1
100 TABLET ORAL DAILY
Qty: 30 TABLET | Refills: 5 | Status: SHIPPED | OUTPATIENT
Start: 2025-01-03 | End: 2025-07-02

## 2025-01-10 ENCOUNTER — HOSPITAL ENCOUNTER (OUTPATIENT)
Dept: MRI IMAGING | Age: 65
Discharge: HOME | End: 2025-01-12
Payer: MEDICARE

## 2025-01-10 DIAGNOSIS — M25.511 CHRONIC RIGHT SHOULDER PAIN: ICD-10-CM

## 2025-01-10 DIAGNOSIS — G89.29 CHRONIC RIGHT SHOULDER PAIN: ICD-10-CM

## 2025-01-10 PROCEDURE — 73221 MRI JOINT UPR EXTREM W/O DYE: CPT

## 2025-01-13 DIAGNOSIS — M75.101 TEAR OF RIGHT SUPRASPINATUS TENDON: Primary | ICD-10-CM

## 2025-02-14 ENCOUNTER — OFFICE VISIT (OUTPATIENT)
Dept: SURGERY | Age: 65
End: 2025-02-14

## 2025-02-14 VITALS
HEIGHT: 68 IN | OXYGEN SATURATION: 98 % | BODY MASS INDEX: 25.01 KG/M2 | SYSTOLIC BLOOD PRESSURE: 132 MMHG | RESPIRATION RATE: 18 BRPM | WEIGHT: 165 LBS | HEART RATE: 71 BPM | DIASTOLIC BLOOD PRESSURE: 88 MMHG

## 2025-02-14 DIAGNOSIS — Z86.0100 HISTORY OF COLON POLYPS: Primary | ICD-10-CM

## 2025-02-18 NOTE — PROGRESS NOTES
[facility] Surgery Clinic Note    Assessment & Plan  1. Screening colonoscopy.  A comprehensive discussion was held regarding the potential risks associated with the procedure, including the possibility of bleeding, perforation, and the need for surgical intervention in rare cases. She was informed that any polyps found would be removed and biopsied if necessary.     PROCEDURE  The patient underwent a colonoscopy in , which revealed the presence of polyps.    Return for Colonoscopy.    Carolann was seen today for new patient.    Diagnoses and all orders for this visit:    History of colon polyps           Chief Complaint   Patient presents with    New Patient     Colon screen (Ref Dr Layne)         PCP: Allan Layne MD  CC: Allan Layne MD     Carolann Gomes is a 64 y.o. female.    History of Present Illness  The patient presents for a colonoscopy.    She was referred for a colonoscopy, having not undergone the procedure since . She reports no current issues with bowel movements and has not observed any blood in her stool. A stool test has not been conducted. She is not experiencing abdominal pain or unexplained weight loss. Her medical history includes cholecystectomy and four  sections. Her previous colonoscopy revealed the presence of polyps.    FAMILY HISTORY  She does not have a family history of Crohn's disease, ulcerative colitis, or colon cancer.        Results      Past Medical History:   Diagnosis Date    GERD (gastroesophageal reflux disease)     Hypertension        Past Surgical History:   Procedure Laterality Date    BACK SURGERY       SECTION      CHOLECYSTECTOMY         Prior to Admission medications    Medication Sig Start Date End Date Taking? Authorizing Provider   atorvastatin (LIPITOR) 20 MG tablet Take 1 tablet by mouth daily 1/3/25  Yes Allan Layne MD   baclofen (LIORESAL) 10 MG tablet Take 1 tablet by mouth 2 times daily 1/3/25  Yes Roverto 
Hpi Title: Evaluation of Skin Lesions
How Severe Are Your Spot(S)?: moderate
Have Your Spot(S) Been Treated In The Past?: has not been treated

## 2025-02-19 ENCOUNTER — OFFICE VISIT (OUTPATIENT)
Dept: ORTHOPEDIC SURGERY | Age: 65
End: 2025-02-19

## 2025-02-19 VITALS
OXYGEN SATURATION: 96 % | SYSTOLIC BLOOD PRESSURE: 140 MMHG | DIASTOLIC BLOOD PRESSURE: 90 MMHG | WEIGHT: 164 LBS | TEMPERATURE: 98.1 F | BODY MASS INDEX: 24.86 KG/M2 | HEIGHT: 68 IN | RESPIRATION RATE: 18 BRPM | HEART RATE: 92 BPM

## 2025-02-19 DIAGNOSIS — M25.511 RIGHT SHOULDER PAIN, UNSPECIFIED CHRONICITY: Primary | ICD-10-CM

## 2025-02-19 NOTE — PROGRESS NOTES
[facility]  ORTHOPAEDICS   DATE OF VISIT: 02/19/25  New  Patient     Referring Provider:   Allan Layne MD  [address] [place] [address]    CHIEF COMPLAINT:   Chief Complaint   Patient presents with    Shoulder Pain     Right shoulder --  states was cutting grass and bag fell off push mower and landed on shoulder fully extended -- weakness, Rt handed -- no formal treatment     Results     Right shoulder MRI: Near full-thickness interstitial and articular-surface tearing of mid  Supraspinatus, partial-thickness interstitial and  articular-surface tearing of infraspinatus and remainder of supraspinatus  between musculotendinous junction and footplate. Moderate underlying infraspinatus tendinosis.  Moderate to severe underlying supraspinatus tendinosis. // Up to 50% partial-thickness interstitial and articular surface tearing of  the insertional fibers of subscapularis.          HPI:      History of Present Illness  The patient presents for evaluation of right shoulder pain.    She sustained a fall in 07/2024, resulting in significant bruising to her ribs. Initially, she was uncertain about the extent of her shoulder injury due to the overall soreness from the fall. However, by 08/2024 or 09/2024, she began to experience weakness in her right shoulder, which is her dominant side. This weakness manifested as an inability to maintain  on objects, leading to frequent dropping. 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 Additionally, she experienced pain in her right shoulder, particularly when sleeping on it. Despite these symptoms, she has not sought any formal therapy or treatment. 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 She has been performing small exercises to preserve flexibility and prevent freezing of the

## 2025-02-19 NOTE — PROGRESS NOTES
Patient blood pressure was taken twice, 140 / 100 + 140 / 90. Patient states in pain and gets anxious at providers office. Denies any symptoms or issues, has taken BP medication. Instructed to keep an eye on BP and let PCP know if continues or if they starts having blurred/double vision or horrible headaches to seek medical attention.

## 2025-02-20 ENCOUNTER — TELEPHONE (OUTPATIENT)
Dept: ORTHOPEDIC SURGERY | Age: 65
End: 2025-02-20

## 2025-02-20 DIAGNOSIS — M12.811 ROTATOR CUFF TEAR ARTHROPATHY OF RIGHT SHOULDER: Primary | ICD-10-CM

## 2025-02-20 DIAGNOSIS — M75.101 ROTATOR CUFF TEAR ARTHROPATHY OF RIGHT SHOULDER: Primary | ICD-10-CM

## 2025-02-20 NOTE — TELEPHONE ENCOUNTER
[facility]  ORTHOPAEDIC SURGERY SCHEDULING NOTE    Patient wishes to proceed with surgery after risks and benefits conversation with Dr. Galeas.     Surgical education was discussed with the patient and a surgical handout was given. Educated patient that pre-admission testing will be contacting them regarding further surgical instructions. Notified that if they are having a joint replacement, they will be scheduled for a mandatory education class. Pre/post-op appointments were made as needed.     Patient is also aware to obtain any clearances prior to surgery.   Clearances required: Medical      Insurance: [employer]    *Authorization details can be found attached to the referral*     Surgery: R SHOULDER, RCR, BICEPS TENODESIS   OR DATE: 3/20  Vendor: [facility]  Block: ISB  CPT: 54396 + 72720  DX: M75.121   Special Needs (if applicable): NA

## 2025-02-24 ENCOUNTER — TELEPHONE (OUTPATIENT)
Dept: ORTHOPEDIC SURGERY | Age: 65
End: 2025-02-24

## 2025-02-25 ENCOUNTER — PREP FOR PROCEDURE (OUTPATIENT)
Dept: SURGERY | Age: 65
End: 2025-02-25

## 2025-02-25 ENCOUNTER — TELEPHONE (OUTPATIENT)
Dept: SURGERY | Age: 65
End: 2025-02-25

## 2025-02-25 DIAGNOSIS — Z86.0100 HX OF COLONIC POLYP: ICD-10-CM

## 2025-02-25 NOTE — TELEPHONE ENCOUNTER
Carolann Gomes is scheduled for colonoscopy with Dr Jones on 06-30-25 at [facility]. Patient needs to be NPO after midnight the night before procedure. All surgery instructions were explained to the patient and a surgery letter was also mailed out. MA informed patient that PAT will also be calling to review pre-op instructions and medications. Patient verbalized understanding.  Electronically signed by Isis Parra MA on 2/25/2025 at 10:01 AM

## 2025-02-27 NOTE — TELEPHONE ENCOUNTER
Josr Silvestre ([phone]) Josiane - ref # [id number] - Peer to Peer - Nurse - Josiane - provided information below as well as has been taking anti-inflammatories since Justina PRN.    Prior authorization obtained:   Auth# [id number]  3/20/25 thru 6/17/25

## 2025-03-12 ENCOUNTER — OFFICE VISIT (OUTPATIENT)
Dept: FAMILY MEDICINE CLINIC | Age: 65
End: 2025-03-12
Payer: MEDICARE

## 2025-03-12 VITALS
SYSTOLIC BLOOD PRESSURE: 130 MMHG | BODY MASS INDEX: 25.31 KG/M2 | OXYGEN SATURATION: 99 % | HEART RATE: 75 BPM | DIASTOLIC BLOOD PRESSURE: 72 MMHG | WEIGHT: 167 LBS | HEIGHT: 68 IN | TEMPERATURE: 97.3 F

## 2025-03-12 DIAGNOSIS — I10 ESSENTIAL HYPERTENSION: ICD-10-CM

## 2025-03-12 DIAGNOSIS — E78.2 MIXED HYPERLIPIDEMIA: ICD-10-CM

## 2025-03-12 DIAGNOSIS — R73.03 PREDIABETES: ICD-10-CM

## 2025-03-12 DIAGNOSIS — S46.011S TRAUMATIC INCOMPLETE TEAR OF RIGHT ROTATOR CUFF, SEQUELA: Primary | ICD-10-CM

## 2025-03-12 LAB
ANION GAP SERPL CALCULATED.3IONS-SCNC: 18 MMOL/L (ref 7–16)
BUN BLDV-MCNC: 17 MG/DL (ref 6–23)
CALCIUM SERPL-MCNC: 9.4 MG/DL (ref 8.6–10.2)
CHLORIDE BLD-SCNC: 102 MMOL/L (ref 98–107)
CHOLESTEROL, TOTAL: 135 MG/DL
CO2: 19 MMOL/L (ref 22–29)
CREAT SERPL-MCNC: 0.7 MG/DL (ref 0.5–1)
GFR, ESTIMATED: >90 ML/MIN/1.73M2
GLUCOSE BLD-MCNC: 106 MG/DL (ref 74–99)
HBA1C MFR BLD: 5.8 % (ref 4–5.6)
HCT VFR BLD CALC: 40.8 % (ref 34–48)
HDLC SERPL-MCNC: 56 MG/DL
HEMOGLOBIN: 13 G/DL (ref 11.5–15.5)
LDL CHOLESTEROL: 63 MG/DL
MCH RBC QN AUTO: 32.2 PG (ref 26–35)
MCHC RBC AUTO-ENTMCNC: 31.9 G/DL (ref 32–34.5)
MCV RBC AUTO: 101 FL (ref 80–99.9)
PDW BLD-RTO: 12.9 % (ref 11.5–15)
PLATELET # BLD: 205 K/UL (ref 130–450)
PMV BLD AUTO: 12.6 FL (ref 7–12)
POTASSIUM SERPL-SCNC: 5.1 MMOL/L (ref 3.5–5)
RBC # BLD: 4.04 M/UL (ref 3.5–5.5)
SODIUM BLD-SCNC: 139 MMOL/L (ref 132–146)
TRIGL SERPL-MCNC: 79 MG/DL
VLDLC SERPL CALC-MCNC: 16 MG/DL
WBC # BLD: 5.8 K/UL (ref 4.5–11.5)

## 2025-03-12 PROCEDURE — G2211 COMPLEX E/M VISIT ADD ON: HCPCS | Performed by: STUDENT IN AN ORGANIZED HEALTH CARE EDUCATION/TRAINING PROGRAM

## 2025-03-12 PROCEDURE — 3075F SYST BP GE 130 - 139MM HG: CPT | Performed by: STUDENT IN AN ORGANIZED HEALTH CARE EDUCATION/TRAINING PROGRAM

## 2025-03-12 PROCEDURE — 99214 OFFICE O/P EST MOD 30 MIN: CPT | Performed by: STUDENT IN AN ORGANIZED HEALTH CARE EDUCATION/TRAINING PROGRAM

## 2025-03-12 PROCEDURE — 1123F ACP DISCUSS/DSCN MKR DOCD: CPT | Performed by: STUDENT IN AN ORGANIZED HEALTH CARE EDUCATION/TRAINING PROGRAM

## 2025-03-12 PROCEDURE — 3078F DIAST BP <80 MM HG: CPT | Performed by: STUDENT IN AN ORGANIZED HEALTH CARE EDUCATION/TRAINING PROGRAM

## 2025-03-12 PROCEDURE — 1159F MED LIST DOCD IN RCRD: CPT | Performed by: STUDENT IN AN ORGANIZED HEALTH CARE EDUCATION/TRAINING PROGRAM

## 2025-03-12 SDOH — ECONOMIC STABILITY: FOOD INSECURITY: WITHIN THE PAST 12 MONTHS, THE FOOD YOU BOUGHT JUST DIDN'T LAST AND YOU DIDN'T HAVE MONEY TO GET MORE.: NEVER TRUE

## 2025-03-12 SDOH — ECONOMIC STABILITY: FOOD INSECURITY: WITHIN THE PAST 12 MONTHS, YOU WORRIED THAT YOUR FOOD WOULD RUN OUT BEFORE YOU GOT MONEY TO BUY MORE.: NEVER TRUE

## 2025-03-12 ASSESSMENT — PATIENT HEALTH QUESTIONNAIRE - PHQ9
SUM OF ALL RESPONSES TO PHQ QUESTIONS 1-9: 0
2. FEELING DOWN, DEPRESSED OR HOPELESS: NOT AT ALL
1. LITTLE INTEREST OR PLEASURE IN DOING THINGS: NOT AT ALL
SUM OF ALL RESPONSES TO PHQ QUESTIONS 1-9: 0

## 2025-03-12 NOTE — PROGRESS NOTES
[facility]  [facility] PRIMARY CARE  [address] [place] [address]  Dept: [phone]  Dept Fax: [phone]   DATE OF VISIT : 3/12/2025      Patient:  Carolann Gomes  Age: 64 y.o.       : 1960      Chief complaint: No chief complaint on file.        History of Present Illness     Patient is a 64 year old female who presents today for follow up on HTN. She reports taking clonidine as prescribe and denies any side affect to the medication. BP has been well controlled at home.     Her overall cholesterol has been stable in the past with lipitor. Is currently due a lipid panel. Reports having a well balanced diet.     Lastly patient is schedule to have surgery on her right rotator cuff later this month. She continues to have pain and weakness in her right shoulder. Denies any numbness and tingling.     Medication List:    Current Outpatient Medications   Medication Sig Dispense Refill    atorvastatin (LIPITOR) 20 MG tablet Take 1 tablet by mouth daily 90 tablet 1    baclofen (LIORESAL) 10 MG tablet Take 1 tablet by mouth 2 times daily 30 tablet 2    cloNIDine (CATAPRES) 0.2 MG tablet Take 1 tablet by mouth in the morning, at noon, and at bedtime 270 tablet 1    losartan (COZAAR) 100 MG tablet Take 1 tablet by mouth daily 30 tablet 5    EPINEPHrine (EPIPEN 2-KELSEY) 0.3 MG/0.3ML SOAJ injection Inject 0.3 mLs into the muscle 1 (one) time if needed (allergic reaction to bee.) Use as directed for allergic reaction 0.6 mL 1    fluticasone (FLONASE) 50 MCG/ACT nasal spray 1 spray by Each Nostril route daily      vitamin D (VITAMIN D3) 250 MCG (77324 UT) CAPS capsule Take 1 capsule by mouth daily      omeprazole (PRILOSEC) 40 MG capsule Take 1 capsule by mouth daily. 30 capsule 0     No current facility-administered medications for this visit.            ROS   Reviewed as above, otherwise negative       Physical Exam   Vitals:   Vitals:    25 1009   BP: 130/72

## 2025-03-13 ENCOUNTER — RESULTS FOLLOW-UP (OUTPATIENT)
Dept: FAMILY MEDICINE CLINIC | Age: 65
End: 2025-03-13

## 2025-03-17 ENCOUNTER — ANESTHESIA EVENT (OUTPATIENT)
Dept: OPERATING ROOM | Age: 65
End: 2025-03-17
Payer: MEDICARE

## 2025-03-17 ASSESSMENT — LIFESTYLE VARIABLES: SMOKING_STATUS: 1

## 2025-03-18 NOTE — ANESTHESIA PRE PROCEDURE
Department of Anesthesiology  Preprocedure Note       Name:  Carolann Gomes   Age:  64 y.o.  :  1960                                          MRN:  [id number]         Date:  3/17/2025      Surgeon: Surgeon(s):  Raymond Galeas MD    Procedure: Procedure(s):  RIGHT SHOULDER ARTHROSCOPY ROTATOR CUFF REPAIR BICEPS TENODESIS VS TENOTOMY   +++ARTHREX+++    Medications prior to admission:   Prior to Admission medications    Medication Sig Start Date End Date Taking? Authorizing Provider   atorvastatin (LIPITOR) 20 MG tablet Take 1 tablet by mouth daily 1/3/25   Allan Layne MD   baclofen (LIORESAL) 10 MG tablet Take 1 tablet by mouth 2 times daily 1/3/25   Allan Layne MD   cloNIDine (CATAPRES) 0.2 MG tablet Take 1 tablet by mouth in the morning, at noon, and at bedtime 1/3/25 7/2/25  Allan Layne MD   losartan (COZAAR) 100 MG tablet Take 1 tablet by mouth daily 1/3/25 7/2/25  Allan Layne MD   EPINEPHrine (EPIPEN 2-KELSEY) 0.3 MG/0.3ML SOAJ injection Inject 0.3 mLs into the muscle 1 (one) time if needed (allergic reaction to bee.) Use as directed for allergic reaction 24   Allan Layne MD   fluticasone (FLONASE) 50 MCG/ACT nasal spray 1 spray by Each Nostril route daily    ProviderRadames MD   vitamin D (VITAMIN D3) 250 MCG (07673 UT) CAPS capsule Take 1 capsule by mouth daily    ProviderRadames MD   omeprazole (PRILOSEC) 40 MG capsule Take 1 capsule by mouth daily. 10/12/12   Rober Edgar DO       Current medications:    No current facility-administered medications for this encounter.     Current Outpatient Medications   Medication Sig Dispense Refill    atorvastatin (LIPITOR) 20 MG tablet Take 1 tablet by mouth daily 90 tablet 1    baclofen (LIORESAL) 10 MG tablet Take 1 tablet by mouth 2 times daily 30 tablet 2    cloNIDine (CATAPRES) 0.2 MG tablet Take 1 tablet by mouth in the morning, at noon, and at bedtime 270 tablet 1    losartan (COZAAR) 100 MG tablet Take 1

## 2025-03-18 NOTE — PROGRESS NOTES
[facility] PRE-ADMISSION TESTING INSTRUCTIONS    Surgery Date 3-20-25  Arrival Time 0730    The Preadmission Testing patient is instructed accordingly using the following criteria (check applicable):    ARRIVAL INSTRUCTIONS:  [x] Parking the day of Surgery is located in the Main Entrance lot.  Upon entering the door, make an immediate right to the surgery reception desk    [x] Bring photo ID and insurance card    [] Bring in a copy of Living will or Durable Power of  papers.    [x] Please be sure to arrange for responsible adult to provide transportation to and from the hospital    [x] Please arrange for responsible adult to be with you for the 24 hour period post procedure due to having anesthesia    [x] If you awake am of surgery not feeling well or have temperature >100 please call [phone]    GENERAL INSTRUCTIONS:    [x] No solid food after midnight. May have up to 8 ounces of WATER ONLY until 4 hours prior to surgery. NPO time 0600.               No gum, candy or mints.    [x] You may brush your teeth, but do not swallow any water    [x] Take medications as instructed with 1-2 oz of water    [x] Stop herbal supplements and vitamins 5 days prior to procedure    [x] Follow preop dosing of blood thinners per physician instructions    [] Take 1/2 dose of evening insulin, but no insulin after midnight    [] No oral diabetic medications after midnight    [] If diabetic and have low blood sugar or feel symptomatic, take 1-2oz apple juice only    [] Bring inhalers day of surgery    [] Bring C-PAP/ Bi-Pap day of surgery    [] Bring urine specimen day of surgery    [x] Shower or bath with soap, lather and rinse well, AM of Surgery, no lotion, powders or creams to surgical site    [] Follow bowel prep as instructed per surgeon    [x] No tobacco products within 24 hours of surgery     [x] No alcohol or illegal drug use within 24 hours of surgery.    [x] Jewelry, body piercing's,

## 2025-03-19 NOTE — PROGRESS NOTES
Left message on Seferino, at Dr. Galeas's office, voicemail regarding medical clearance not on chart.

## 2025-03-20 ENCOUNTER — ANESTHESIA (OUTPATIENT)
Dept: OPERATING ROOM | Age: 65
End: 2025-03-20
Payer: MEDICARE

## 2025-03-20 ENCOUNTER — HOSPITAL ENCOUNTER (OUTPATIENT)
Age: 65
Setting detail: OUTPATIENT SURGERY
Discharge: HOME | End: 2025-03-20
Attending: ORTHOPAEDIC SURGERY | Admitting: ORTHOPAEDIC SURGERY
Payer: MEDICARE

## 2025-03-20 VITALS
TEMPERATURE: 97 F | BODY MASS INDEX: 25.31 KG/M2 | OXYGEN SATURATION: 99 % | WEIGHT: 167 LBS | HEART RATE: 80 BPM | SYSTOLIC BLOOD PRESSURE: 170 MMHG | HEIGHT: 68 IN | DIASTOLIC BLOOD PRESSURE: 90 MMHG | RESPIRATION RATE: 14 BRPM

## 2025-03-20 DIAGNOSIS — Z98.890 STATUS POST ARTHROSCOPY OF RIGHT SHOULDER: Primary | ICD-10-CM

## 2025-03-20 LAB
EKG ATRIAL RATE: 72 BPM
EKG P AXIS: 39 DEGREES
EKG P-R INTERVAL: 140 MS
EKG Q-T INTERVAL: 374 MS
EKG QRS DURATION: 76 MS
EKG QTC CALCULATION (BAZETT): 409 MS
EKG R AXIS: 12 DEGREES
EKG T AXIS: 15 DEGREES
EKG VENTRICULAR RATE: 72 BPM

## 2025-03-20 PROCEDURE — 7100000011 HC PHASE II RECOVERY - ADDTL 15 MIN: Performed by: ORTHOPAEDIC SURGERY

## 2025-03-20 PROCEDURE — 2720000010 HC SURG SUPPLY STERILE: Performed by: ORTHOPAEDIC SURGERY

## 2025-03-20 PROCEDURE — 6360000002 HC RX W HCPCS: Performed by: ORTHOPAEDIC SURGERY

## 2025-03-20 PROCEDURE — 6360000002 HC RX W HCPCS: Performed by: ANESTHESIOLOGY

## 2025-03-20 PROCEDURE — 2500000003 HC RX 250 WO HCPCS: Performed by: ORTHOPAEDIC SURGERY

## 2025-03-20 PROCEDURE — 29827 SHO ARTHRS SRG RT8TR CUF RPR: CPT | Performed by: ORTHOPAEDIC SURGERY

## 2025-03-20 PROCEDURE — 29826 SHO ARTHRS SRG DECOMPRESSION: CPT | Performed by: ORTHOPAEDIC SURGERY

## 2025-03-20 PROCEDURE — C1713 ANCHOR/SCREW BN/BN,TIS/BN: HCPCS | Performed by: ORTHOPAEDIC SURGERY

## 2025-03-20 PROCEDURE — 6360000002 HC RX W HCPCS

## 2025-03-20 PROCEDURE — 6370000000 HC RX 637 (ALT 250 FOR IP): Performed by: ANESTHESIOLOGY

## 2025-03-20 PROCEDURE — 7100000000 HC PACU RECOVERY - FIRST 15 MIN: Performed by: ORTHOPAEDIC SURGERY

## 2025-03-20 PROCEDURE — 29828 SHO ARTHRS SRG BICP TENODSIS: CPT | Performed by: ORTHOPAEDIC SURGERY

## 2025-03-20 PROCEDURE — 3700000001 HC ADD 15 MINUTES (ANESTHESIA): Performed by: ORTHOPAEDIC SURGERY

## 2025-03-20 PROCEDURE — 7100000010 HC PHASE II RECOVERY - FIRST 15 MIN: Performed by: ORTHOPAEDIC SURGERY

## 2025-03-20 PROCEDURE — 3700000000 HC ANESTHESIA ATTENDED CARE: Performed by: ORTHOPAEDIC SURGERY

## 2025-03-20 PROCEDURE — 2500000003 HC RX 250 WO HCPCS

## 2025-03-20 PROCEDURE — 3600000013 HC SURGERY LEVEL 3 ADDTL 15MIN: Performed by: ORTHOPAEDIC SURGERY

## 2025-03-20 PROCEDURE — 64415 NJX AA&/STRD BRCH PLXS IMG: CPT | Performed by: ANESTHESIOLOGY

## 2025-03-20 PROCEDURE — 2709999900 HC NON-CHARGEABLE SUPPLY: Performed by: ORTHOPAEDIC SURGERY

## 2025-03-20 PROCEDURE — 7100000001 HC PACU RECOVERY - ADDTL 15 MIN: Performed by: ORTHOPAEDIC SURGERY

## 2025-03-20 PROCEDURE — 3600000003 HC SURGERY LEVEL 3 BASE: Performed by: ORTHOPAEDIC SURGERY

## 2025-03-20 PROCEDURE — 2580000003 HC RX 258: Performed by: ORTHOPAEDIC SURGERY

## 2025-03-20 DEVICE — LNT IMPLANT SYSTEM, 4.75 BC KL SWIVELOCK
Type: IMPLANTABLE DEVICE | Site: SHOULDER | Status: FUNCTIONAL
Brand: ARTHREX®

## 2025-03-20 DEVICE — SP FBRTAK RC TGRTPE WH/BLK & STTPE WH/BL
Type: IMPLANTABLE DEVICE | Site: SHOULDER | Status: FUNCTIONAL
Brand: ARTHREX®

## 2025-03-20 DEVICE — SWIVELOCK, SP BC KL 4.75MM
Type: IMPLANTABLE DEVICE | Site: SHOULDER | Status: FUNCTIONAL
Brand: ARTHREX®

## 2025-03-20 DEVICE — SP FBRTAK RC FBRTPE BLK/BLU & STTPE BLU
Type: IMPLANTABLE DEVICE | Site: SHOULDER | Status: FUNCTIONAL
Brand: ARTHREX®

## 2025-03-20 RX ORDER — DIPHENHYDRAMINE HYDROCHLORIDE 50 MG/ML
12.5 INJECTION, SOLUTION INTRAMUSCULAR; INTRAVENOUS EVERY 6 HOURS PRN
Status: DISCONTINUED | OUTPATIENT
Start: 2025-03-20 | End: 2025-03-20 | Stop reason: HOSPADM

## 2025-03-20 RX ORDER — ROPIVACAINE HYDROCHLORIDE 5 MG/ML
INJECTION, SOLUTION EPIDURAL; INFILTRATION; PERINEURAL
Status: COMPLETED | OUTPATIENT
Start: 2025-03-20 | End: 2025-03-20

## 2025-03-20 RX ORDER — HYDROCODONE BITARTRATE AND ACETAMINOPHEN 5; 325 MG/1; MG/1
1 TABLET ORAL EVERY 6 HOURS PRN
Qty: 28 TABLET | Refills: 0 | Status: SHIPPED | OUTPATIENT
Start: 2025-03-20 | End: 2025-03-27

## 2025-03-20 RX ORDER — METHOCARBAMOL 100 MG/ML
1000 INJECTION, SOLUTION INTRAMUSCULAR; INTRAVENOUS ONCE
Status: COMPLETED | OUTPATIENT
Start: 2025-03-20 | End: 2025-03-20

## 2025-03-20 RX ORDER — DIPHENHYDRAMINE HYDROCHLORIDE 50 MG/ML
INJECTION, SOLUTION INTRAMUSCULAR; INTRAVENOUS
Status: DISCONTINUED | OUTPATIENT
Start: 2025-03-20 | End: 2025-03-20 | Stop reason: SDUPTHER

## 2025-03-20 RX ORDER — PROPOFOL 10 MG/ML
INJECTION, EMULSION INTRAVENOUS
Status: DISCONTINUED | OUTPATIENT
Start: 2025-03-20 | End: 2025-03-20 | Stop reason: SDUPTHER

## 2025-03-20 RX ORDER — DEXAMETHASONE SODIUM PHOSPHATE 4 MG/ML
INJECTION, SOLUTION INTRA-ARTICULAR; INTRALESIONAL; INTRAMUSCULAR; INTRAVENOUS; SOFT TISSUE
Status: DISCONTINUED | OUTPATIENT
Start: 2025-03-20 | End: 2025-03-20 | Stop reason: SDUPTHER

## 2025-03-20 RX ORDER — HYDRALAZINE HYDROCHLORIDE 20 MG/ML
5 INJECTION INTRAMUSCULAR; INTRAVENOUS
Status: DISCONTINUED | OUTPATIENT
Start: 2025-03-20 | End: 2025-03-20 | Stop reason: HOSPADM

## 2025-03-20 RX ORDER — HYDRALAZINE HYDROCHLORIDE 20 MG/ML
5 INJECTION INTRAMUSCULAR; INTRAVENOUS ONCE
Status: CANCELLED | OUTPATIENT
Start: 2025-03-20

## 2025-03-20 RX ORDER — SODIUM CHLORIDE 0.9 % (FLUSH) 0.9 %
5-40 SYRINGE (ML) INJECTION PRN
Status: DISCONTINUED | OUTPATIENT
Start: 2025-03-20 | End: 2025-03-20 | Stop reason: HOSPADM

## 2025-03-20 RX ORDER — KETOROLAC TROMETHAMINE 10 MG/1
10 TABLET, FILM COATED ORAL EVERY 6 HOURS
Qty: 8 TABLET | Refills: 0 | Status: SHIPPED | OUTPATIENT
Start: 2025-03-20 | End: 2025-03-22

## 2025-03-20 RX ORDER — ROCURONIUM BROMIDE 10 MG/ML
INJECTION, SOLUTION INTRAVENOUS
Status: DISCONTINUED | OUTPATIENT
Start: 2025-03-20 | End: 2025-03-20 | Stop reason: SDUPTHER

## 2025-03-20 RX ORDER — LABETALOL HYDROCHLORIDE 5 MG/ML
5 INJECTION, SOLUTION INTRAVENOUS
Status: DISCONTINUED | OUTPATIENT
Start: 2025-03-20 | End: 2025-03-20 | Stop reason: HOSPADM

## 2025-03-20 RX ORDER — PROCHLORPERAZINE EDISYLATE 5 MG/ML
5 INJECTION INTRAMUSCULAR; INTRAVENOUS
Status: DISCONTINUED | OUTPATIENT
Start: 2025-03-20 | End: 2025-03-20 | Stop reason: HOSPADM

## 2025-03-20 RX ORDER — ONDANSETRON 2 MG/ML
INJECTION INTRAMUSCULAR; INTRAVENOUS
Status: DISCONTINUED | OUTPATIENT
Start: 2025-03-20 | End: 2025-03-20 | Stop reason: SDUPTHER

## 2025-03-20 RX ORDER — SODIUM CHLORIDE 0.9 % (FLUSH) 0.9 %
5-40 SYRINGE (ML) INJECTION EVERY 12 HOURS SCHEDULED
Status: DISCONTINUED | OUTPATIENT
Start: 2025-03-20 | End: 2025-03-20 | Stop reason: HOSPADM

## 2025-03-20 RX ORDER — FENTANYL CITRATE 50 UG/ML
25 INJECTION, SOLUTION INTRAMUSCULAR; INTRAVENOUS PRN
Status: DISCONTINUED | OUTPATIENT
Start: 2025-03-20 | End: 2025-03-20 | Stop reason: HOSPADM

## 2025-03-20 RX ORDER — MEPERIDINE HYDROCHLORIDE 50 MG/ML
INJECTION INTRAMUSCULAR; INTRAVENOUS; SUBCUTANEOUS
Status: COMPLETED
Start: 2025-03-20 | End: 2025-03-20

## 2025-03-20 RX ORDER — ESMOLOL HYDROCHLORIDE 10 MG/ML
INJECTION INTRAVENOUS
Status: DISCONTINUED | OUTPATIENT
Start: 2025-03-20 | End: 2025-03-20 | Stop reason: SDUPTHER

## 2025-03-20 RX ORDER — DIPHENHYDRAMINE HYDROCHLORIDE 50 MG/ML
12.5 INJECTION, SOLUTION INTRAMUSCULAR; INTRAVENOUS
Status: DISCONTINUED | OUTPATIENT
Start: 2025-03-20 | End: 2025-03-20 | Stop reason: HOSPADM

## 2025-03-20 RX ORDER — ROPIVACAINE HYDROCHLORIDE 5 MG/ML
30 INJECTION, SOLUTION EPIDURAL; INFILTRATION; PERINEURAL
Status: DISCONTINUED | OUTPATIENT
Start: 2025-03-20 | End: 2025-03-20 | Stop reason: HOSPADM

## 2025-03-20 RX ORDER — MEPERIDINE HYDROCHLORIDE 50 MG/ML
12.5 INJECTION INTRAMUSCULAR; INTRAVENOUS; SUBCUTANEOUS ONCE
Status: COMPLETED | OUTPATIENT
Start: 2025-03-20 | End: 2025-03-20

## 2025-03-20 RX ORDER — ACETAMINOPHEN 500 MG
1000 TABLET ORAL ONCE
Status: COMPLETED | OUTPATIENT
Start: 2025-03-20 | End: 2025-03-20

## 2025-03-20 RX ORDER — EPHEDRINE SULFATE/0.9% NACL/PF 25 MG/5 ML
SYRINGE (ML) INTRAVENOUS
Status: DISCONTINUED | OUTPATIENT
Start: 2025-03-20 | End: 2025-03-20 | Stop reason: SDUPTHER

## 2025-03-20 RX ORDER — FENTANYL CITRATE 50 UG/ML
25 INJECTION, SOLUTION INTRAMUSCULAR; INTRAVENOUS EVERY 5 MIN PRN
Status: DISCONTINUED | OUTPATIENT
Start: 2025-03-20 | End: 2025-03-20 | Stop reason: HOSPADM

## 2025-03-20 RX ORDER — SODIUM CHLORIDE 9 MG/ML
INJECTION, SOLUTION INTRAVENOUS PRN
Status: DISCONTINUED | OUTPATIENT
Start: 2025-03-20 | End: 2025-03-20 | Stop reason: HOSPADM

## 2025-03-20 RX ORDER — LIDOCAINE HYDROCHLORIDE 20 MG/ML
INJECTION, SOLUTION EPIDURAL; INFILTRATION; INTRACAUDAL; PERINEURAL
Status: DISCONTINUED | OUTPATIENT
Start: 2025-03-20 | End: 2025-03-20 | Stop reason: SDUPTHER

## 2025-03-20 RX ORDER — NALOXONE HYDROCHLORIDE 0.4 MG/ML
INJECTION, SOLUTION INTRAMUSCULAR; INTRAVENOUS; SUBCUTANEOUS PRN
Status: DISCONTINUED | OUTPATIENT
Start: 2025-03-20 | End: 2025-03-20 | Stop reason: HOSPADM

## 2025-03-20 RX ORDER — METOCLOPRAMIDE HYDROCHLORIDE 5 MG/ML
10 INJECTION INTRAMUSCULAR; INTRAVENOUS ONCE
Status: DISCONTINUED | OUTPATIENT
Start: 2025-03-20 | End: 2025-03-20 | Stop reason: HOSPADM

## 2025-03-20 RX ORDER — CLONIDINE HYDROCHLORIDE 0.1 MG/1
0.2 TABLET ORAL ONCE
Status: COMPLETED | OUTPATIENT
Start: 2025-03-20 | End: 2025-03-20

## 2025-03-20 RX ORDER — EPINEPHRINE 1 MG/ML
INJECTION, SOLUTION, CONCENTRATE INTRAVENOUS PRN
Status: DISCONTINUED | OUTPATIENT
Start: 2025-03-20 | End: 2025-03-20 | Stop reason: ALTCHOICE

## 2025-03-20 RX ORDER — MIDAZOLAM HYDROCHLORIDE 2 MG/2ML
0.5 INJECTION, SOLUTION INTRAMUSCULAR; INTRAVENOUS PRN
Status: DISCONTINUED | OUTPATIENT
Start: 2025-03-20 | End: 2025-03-20 | Stop reason: HOSPADM

## 2025-03-20 RX ADMIN — PROPOFOL 150 MG: 10 INJECTION, EMULSION INTRAVENOUS at 10:19

## 2025-03-20 RX ADMIN — DIPHENHYDRAMINE HYDROCHLORIDE 12.5 MG: 50 INJECTION INTRAMUSCULAR; INTRAVENOUS at 10:22

## 2025-03-20 RX ADMIN — ESMOLOL HYDROCHLORIDE 30 MG: 10 INJECTION, SOLUTION INTRAVENOUS at 10:19

## 2025-03-20 RX ADMIN — DEXAMETHASONE SODIUM PHOSPHATE 10 MG: 4 INJECTION, SOLUTION INTRAMUSCULAR; INTRAVENOUS at 10:22

## 2025-03-20 RX ADMIN — MIDAZOLAM HYDROCHLORIDE 1 MG: 1 INJECTION, SOLUTION INTRAMUSCULAR; INTRAVENOUS at 09:05

## 2025-03-20 RX ADMIN — ONDANSETRON 4 MG: 2 INJECTION INTRAMUSCULAR; INTRAVENOUS at 11:45

## 2025-03-20 RX ADMIN — WATER 2000 MG: 1 INJECTION INTRAMUSCULAR; INTRAVENOUS; SUBCUTANEOUS at 10:22

## 2025-03-20 RX ADMIN — MEPERIDINE HYDROCHLORIDE 12.5 MG: 50 INJECTION INTRAMUSCULAR; INTRAVENOUS; SUBCUTANEOUS at 12:32

## 2025-03-20 RX ADMIN — ROCURONIUM BROMIDE 50 MG: 10 INJECTION, SOLUTION INTRAVENOUS at 10:19

## 2025-03-20 RX ADMIN — HYDRALAZINE HYDROCHLORIDE 5 MG: 20 INJECTION INTRAMUSCULAR; INTRAVENOUS at 14:49

## 2025-03-20 RX ADMIN — METHOCARBAMOL 1000 MG: 100 INJECTION INTRAMUSCULAR; INTRAVENOUS at 12:28

## 2025-03-20 RX ADMIN — ROPIVACAINE HYDROCHLORIDE 30 ML: 5 INJECTION, SOLUTION EPIDURAL; INFILTRATION; PERINEURAL at 09:07

## 2025-03-20 RX ADMIN — ACETAMINOPHEN 1000 MG: 500 TABLET ORAL at 09:19

## 2025-03-20 RX ADMIN — LIDOCAINE HYDROCHLORIDE 100 MG: 20 INJECTION, SOLUTION EPIDURAL; INFILTRATION; INTRACAUDAL; PERINEURAL at 10:19

## 2025-03-20 RX ADMIN — EPHEDRINE SULFATE 5 MG: 5 INJECTION INTRAVENOUS at 10:59

## 2025-03-20 RX ADMIN — SODIUM CHLORIDE: 9 INJECTION, SOLUTION INTRAVENOUS at 10:13

## 2025-03-20 RX ADMIN — FENTANYL CITRATE 50 MCG: 50 INJECTION INTRAMUSCULAR; INTRAVENOUS at 09:05

## 2025-03-20 RX ADMIN — SODIUM CHLORIDE: 9 INJECTION, SOLUTION INTRAVENOUS at 11:10

## 2025-03-20 RX ADMIN — Medication 20 MG: at 10:19

## 2025-03-20 RX ADMIN — SUGAMMADEX 152 MG: 100 INJECTION, SOLUTION INTRAVENOUS at 12:01

## 2025-03-20 RX ADMIN — CLONIDINE HYDROCHLORIDE 0.2 MG: 0.1 TABLET ORAL at 13:39

## 2025-03-20 ASSESSMENT — PAIN DESCRIPTION - LOCATION
LOCATION: SHOULDER

## 2025-03-20 ASSESSMENT — PAIN - FUNCTIONAL ASSESSMENT
PAIN_FUNCTIONAL_ASSESSMENT: PREVENTS OR INTERFERES SOME ACTIVE ACTIVITIES AND ADLS
PAIN_FUNCTIONAL_ASSESSMENT: 0-10
PAIN_FUNCTIONAL_ASSESSMENT: PREVENTS OR INTERFERES SOME ACTIVE ACTIVITIES AND ADLS
PAIN_FUNCTIONAL_ASSESSMENT: NONE - DENIES PAIN
PAIN_FUNCTIONAL_ASSESSMENT: PREVENTS OR INTERFERES SOME ACTIVE ACTIVITIES AND ADLS

## 2025-03-20 ASSESSMENT — PAIN SCALES - GENERAL
PAINLEVEL_OUTOF10: 2
PAINLEVEL_OUTOF10: 0
PAINLEVEL_OUTOF10: 2
PAINLEVEL_OUTOF10: 2
PAINLEVEL_OUTOF10: 0
PAINLEVEL_OUTOF10: 0
PAINLEVEL_OUTOF10: 2
PAINLEVEL_OUTOF10: 4
PAINLEVEL_OUTOF10: 4
PAINLEVEL_OUTOF10: 0
PAINLEVEL_OUTOF10: 0
PAINLEVEL_OUTOF10: 2
PAINLEVEL_OUTOF10: 2

## 2025-03-20 ASSESSMENT — PAIN DESCRIPTION - DESCRIPTORS
DESCRIPTORS: DISCOMFORT
DESCRIPTORS: DULL;DISCOMFORT;ACHING
DESCRIPTORS: DISCOMFORT
DESCRIPTORS: NUMBNESS;DULL
DESCRIPTORS: ACHING;DISCOMFORT;GNAWING
DESCRIPTORS: ACHING;DISCOMFORT;DULL
DESCRIPTORS: ACHING;DULL;DISCOMFORT

## 2025-03-20 ASSESSMENT — PAIN DESCRIPTION - PAIN TYPE
TYPE: SURGICAL PAIN

## 2025-03-20 ASSESSMENT — PAIN DESCRIPTION - ORIENTATION
ORIENTATION: RIGHT

## 2025-03-20 NOTE — ANESTHESIA PROCEDURE NOTES
Peripheral Block    Patient location during procedure: pre-op  Reason for block: post-op pain management and at surgeon's request  Start time: 3/20/2025 9:06 AM  End time: 3/20/2025 9:08 AM  Staffing  Performed: anesthesiologist   Anesthesiologist: Javan Ovalle DO  Performed by: Javan Ovalle DO  Authorized by: Javan Ovalle DO    Preanesthetic Checklist  Completed: patient identified, IV checked, site marked, risks and benefits discussed, surgical/procedural consents, equipment checked, pre-op evaluation, timeout performed, anesthesia consent given, oxygen available and monitors applied/VS acknowledged  Peripheral Block   Patient position: sitting  Prep: ChloraPrep  Provider prep: mask and sterile gloves  Patient monitoring: IV access, frequent blood pressure checks, continuous pulse ox and cardiac monitor  Block type: Brachial plexus  Interscalene  Laterality: right  Injection technique: single-shot  Guidance: ultrasound guided    Needle   Needle type: combined needle/nerve stimulator   Needle gauge: 22 G  Needle localization: anatomical landmarks and ultrasound guidance  Needle length: 10 cm  Assessment   Injection assessment: negative aspiration for heme, no paresthesia on injection and local visualized surrounding nerve on ultrasound  Paresthesia pain: none  Slow fractionated injection: yes  Hemodynamics: stable  Outcomes: uncomplicated and patient tolerated procedure well    Additional Notes  Simple uncomplicated R ISB with US guidance.  Negative aspiration Q 5cc.  Needle completely visualized throughout.  Medications Administered  ropivacaine (NAROPIN) injection 0.5% - Perineural   30 mL - 3/20/2025 9:07:00 AM

## 2025-03-20 NOTE — H&P
[facility]  ORTHOPAEDICS   DATE OF VISIT: 25  New  Patient     Referring Provider:   No referring provider defined for this encounter.    CHIEF COMPLAINT:   No chief complaint on file.       HPI:      History of Present Illness  The patient presents for evaluation of right shoulder pain.    She sustained a fall in 2024, resulting in significant bruising to her ribs. Initially, she was uncertain about the extent of her shoulder injury due to the overall soreness from the fall. However, by 2024 or 2024, she began to experience weakness in her right shoulder, which is her dominant side. This weakness manifested as an inability to maintain  on objects, leading to frequent dropping. 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 Additionally, she experienced pain in her right shoulder, particularly when sleeping on it. Despite these symptoms, she has not sought any formal therapy or treatment. 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 She has been performing small exercises to preserve flexibility and prevent freezing of the joint.      PAST MEDICAL HISTORY  Past Medical History:   Diagnosis Date    GERD (gastroesophageal reflux disease)     H/O seasonal allergies     Hypertension     PONV (postoperative nausea and vomiting)     Right shoulder pain        PAST SURGICAL HISTORY  Past Surgical History:   Procedure Laterality Date    BACK SURGERY      CARPAL TUNNEL RELEASE Right 2020     SECTION      CHOLECYSTECTOMY      FINGER SURGERY Right 2020    third finger         FAMILY HISTORY   Family History   Problem Relation Age of Onset    Cancer Father 85        Prostate    Heart Disease Father     High Blood Pressure Father     Cancer Sister 52        Oat Cell    Breast Cancer Paternal Aunt         Not sure of age   Speed test negative.  Covina's test negative.  Belly press test mildly positive for pain.   There is normal sensation over the deltoid.           IMAGING:    Results  Imaging  MRI reviewed showing small full-thickness tear of the very anterior portion of the supraspinatus.  There is also more medial musculotendinous junction injury which does not appear to be full-thickness.  There are some cystic changes at the subscapularis insertion possible partial tearing the upper subscapularis.          ASSESSMENT/PLAN:    Assessment & Plan  1. Right shoulder rotator cuff tear.  The MRI results indicate a traumatic tear in the rotator cuff, likely from a fall. The patient has been experiencing pain and weakness since August or September, with difficulty holding objects and pain at night, especially when lying on the right side. Physical examination reveals pain with certain movements and tenderness under the shoulder blade. The patient has been managing the condition with self-directed exercises to maintain flexibility. Given the persistence of symptoms for 5 to 7 months, surgical intervention is considered appropriate. A comprehensive discussion was held regarding both non-surgical and surgical treatment options. Non-surgical options include injections, physical therapy, and giving it more time. Surgical option involves arthroscopic rotator cuff repair, which includes using several small incisions to fix the tendon and any other abnormalities. The patient prefers to proceed with surgery. The recovery process involves wearing a sling for 4 to 6 weeks, followed by physical therapy, with full recovery expected in 4 to 6 months. The surgery will be scheduled as an outpatient procedure, and the patient will be seen the next day for a follow-up to review the rehab protocol and change the dressing.    Surgery will consist of right shoulder arthroscopy, rotator cuff repair, possible biceps tenodesis.      Raymond Galeas,

## 2025-03-20 NOTE — OP NOTE
OPERATIVE REPORT    PATIENT:  Carolann Gomes  [id number]    DATE OF PROCEDURE:  3/20/25    SURGEON: Raymond Galeas MD    ASSISTANT:  Antonio Wei DO, resident assisting     PREOPERATIVE DIAGNOSIS: Right shoulder rotator cuff tear     POSTOPERATIVE DIAGNOSIS: Right shoulder rotator cuff tear, biceps tear, subacromial impingement     OPERATION: Right shoulder arthroscopy, biceps tenodesis, subscapularis repair, supraspinatus repair, subacromial decompression with acromioplasty    ANESTHESIA: . general and interscalene block    OPERATIVE INDICATIONS:  The patient is a 64 year old female with right shoulder pain refractory to conservative treatment.  She underwent an MRI showing a full-thickness tear of the supraspinatus as well as abnormality the biceps and subscapularis.  This was affecting her quality of life.  She was also awaiting pain and trouble sleeping at night.  The patient is thus indicated for shoulder arthroscopy and rotator cuff repair possible biceps tenodesis.  The risks and benefits, as well as alternatives were discussed at length with the patient in detail.  These risks include, but are not limited to infection, nerve and/or blood vessel injury, intra or post operative fracture, need for revision surgery, failure of implants, deep vein thrombosis and pulmonary embolism, shoulder stiffness, decreased motion, persistent pain, and the risks of general anesthesia provided by the anesthesiologist.   The patient understood these risks, signed an informed consent, and elected to proceed      OPERATIVE PROCEDURE: After satisfactory general anesthesia, as well as scalene block, the patient was placed supine on the operative table on a bean bag.  The shoulder was examined under anesthesia and range of motion was noted to be 180 degrees forward elevation, and with the shoulder abducted 90 degrees, 90 external and 80 internal rotation.  There was not increased translation with anterior/posterior load  with buried 4-O moncryl.  The wounds were covered with steri strips, xeroform, 4x4, and tape.  The shoulder was placed in a sling.  The patient was awoken from anesthesia and transferred to the recovery room in stable condition.      IMPLANTS:   Implant Name Type Inv. Item Serial No.  Lot No. LRB No. Used Action   KIT 4.75MM LOOP N DOUGIE BICEP - [id number]  KIT 4.75MM LOOP N DOUGIE BICEP  [employer]  Right 1 Implanted   ANCHOR BONE SP FBRTAK RC FBRTPE BLK/DIANNA  - [id number]  ANCHOR BONE SP FBRTAK RC FBRTPE BLK/DIANNA   [employer]WD [id number] Right 1 Implanted   ANCHOR BONE SP FBRTAK RC TGRTPE WH/BLK  - [id number]  ANCHOR BONE SP FBRTAK RC TGRTPE WH/BLK   [employer]WD [id number] Right 1 Implanted   ANCHOR SUTURE L 24.5 MM AD 4.75 MM SUTURE SZ 2 B-TCP/ PEEK - [id number]  ANCHOR SUTURE L 24.5 MM AD 4.75 MM SUTURE SZ 2 B-TCP/ PEEK  [employer]WD [id number] Right 1 Implanted   ANCHOR SUTURE L 24.5 MM AD 4.75 MM SUTURE SZ 2 B-TCP/ PEEK - [id number]  ANCHOR SUTURE L 24.5 MM AD 4.75 MM SUTURE SZ 2 B-TCP/ PEEK  [employer]WD [id number] Right 1 Implanted         ESTIMATED BLOOD LOSS:  5 mL    COMPLICATIONS: none    POST OPERATIVE PLAN: The patient will discharged home from PACU once stable.  Follow up in office will be post operative day 1 or 2.  Dressing will stay on and ice applied until follow up.  The patient will remain in the sling.        Raymond Galeas MD  Orthopaedic Surgery   3/20/25  12:20 PM

## 2025-03-20 NOTE — PROGRESS NOTES
Manual pressure is 180/100 Talked with Dr Ovalle   Verbal order for 5mg hydralazine and pt can discharge home

## 2025-03-20 NOTE — DISCHARGE INSTRUCTIONS
Orthopaedic Discharge Instructions    Surgery: Shoulder arthroscopy     Activity:   Remain in sling at all times until follow up  You can do elbow, wrist, and hand motion exercises including squeezing ball.  No active or passive shoulder motion.    Apply ICE to the shoulder as much as possible.  It will likely be most comfortable for you to sleep sitting up in a recliner.      Medications:  Take prescribed medications as indicated.  These include pain medication, anti-inflammatory    Dressing care:  Keep your dressing on until you are seen in the office    Follow up:  Your follow up appointment is scheduled for tomorrow.  Please call [phone] with any questions       Raymond Galeas MD  Orthopaedic Surgery     DISPLAY PLAN FREE TEXT no

## 2025-03-20 NOTE — PROGRESS NOTES
Pt pressures 178/93 and 178/100 15 minutes apart   Pt states she takes clonidine 0.2mg TID   Called Dr Coronel advised pt takes her medication at 2pm OK to give her home medications  Placed order clonidine is listed as allergy Confirmed with patient she states she does not know why it is listed as an allergy she takes the medication TID  Reaction listed was somnolence

## 2025-03-20 NOTE — ANESTHESIA POSTPROCEDURE EVALUATION
Department of Anesthesiology  Postprocedure Note    Patient: Carolann Gomes  MRN: [id number]  YOB: 1960  Date of evaluation: 3/20/2025    Procedure Summary       Date: 03/20/25 [facility] [address] [facility]    Anesthesia Start: 1009 Anesthesia Stop: 1211    Procedure: RIGHT SHOULDER ARTHROSCOPY ROTATOR CUFF REPAIR BICEPS TENODESIS WITH SUBSCAPULARIS REPAIR (Right: Shoulder) Diagnosis:       Right rotator cuff tear      (Right rotator cuff tear [M75.101])    Surgeons: Raymond Galeas MD Responsible Provider: Javan Ovalle DO    Anesthesia Type: general, regional ASA Status: 2            Anesthesia Type: No value filed.    Naomi Phase I: Naomi Score: 10    Naomi Phase II:      Anesthesia Post Evaluation    Patient location during evaluation: PACU  Patient participation: complete - patient participated  Level of consciousness: awake and alert  Pain score: 0  Airway patency: patent  Nausea & Vomiting: no nausea and no vomiting  Cardiovascular status: blood pressure returned to baseline  Respiratory status: acceptable  Hydration status: euvolemic  Comments: Patient seen and examined.  Progressing as expected.  No anesthetic related questions or concerns at this time.  Multimodal analgesia pain management approach  Pain management: adequate      No notable events documented.

## 2025-03-21 ENCOUNTER — OFFICE VISIT (OUTPATIENT)
Dept: ORTHOPEDIC SURGERY | Age: 65
End: 2025-03-21

## 2025-03-21 DIAGNOSIS — Z98.890 S/P ARTHROSCOPY OF RIGHT SHOULDER: Primary | ICD-10-CM

## 2025-03-21 DIAGNOSIS — M12.811 ROTATOR CUFF TEAR ARTHROPATHY OF RIGHT SHOULDER: ICD-10-CM

## 2025-03-21 DIAGNOSIS — M75.101 ROTATOR CUFF TEAR ARTHROPATHY OF RIGHT SHOULDER: ICD-10-CM

## 2025-03-21 PROCEDURE — 99024 POSTOP FOLLOW-UP VISIT: CPT | Performed by: NURSE PRACTITIONER

## 2025-03-21 NOTE — PROGRESS NOTES
CLINICAL PHARMACY NOTE: MEDS TO BEDS    Total # of Prescriptions Filled: 2   The following medications were delivered to the patient:  NORCO 5/ 325 MG  KETOROLAC 10 MG    Additional Documentation:     Patient returned call. Please call him back.

## 2025-03-26 DIAGNOSIS — M75.101 ROTATOR CUFF TEAR ARTHROPATHY OF RIGHT SHOULDER: ICD-10-CM

## 2025-03-26 DIAGNOSIS — M12.811 ROTATOR CUFF TEAR ARTHROPATHY OF RIGHT SHOULDER: ICD-10-CM

## 2025-03-26 DIAGNOSIS — Z98.890 S/P ARTHROSCOPY OF RIGHT SHOULDER: Primary | ICD-10-CM

## 2025-03-26 RX ORDER — HYDROCODONE BITARTRATE AND ACETAMINOPHEN 5; 325 MG/1; MG/1
1 TABLET ORAL EVERY 6 HOURS PRN
Qty: 28 TABLET | Refills: 0 | Status: SHIPPED | OUTPATIENT
Start: 2025-03-27 | End: 2025-04-03

## 2025-03-26 NOTE — TELEPHONE ENCOUNTER
Patient called office requesting refill on post op medication.     Surgery: Right shoulder arthroscopy, biceps tenodesis, rotator repair. subacromial decompression with acromioplasty  Date: 3/20/2025     Last refill: 3/20/2025      Prior orders:  3/20/2025    Medication pended and routed     [facility]

## 2025-04-30 ENCOUNTER — OFFICE VISIT (OUTPATIENT)
Dept: ORTHOPEDIC SURGERY | Age: 65
End: 2025-04-30

## 2025-04-30 ENCOUNTER — TELEPHONE (OUTPATIENT)
Dept: PHYSICAL THERAPY | Age: 65
End: 2025-04-30

## 2025-04-30 VITALS
OXYGEN SATURATION: 98 % | BODY MASS INDEX: 25.31 KG/M2 | HEART RATE: 76 BPM | TEMPERATURE: 98.4 F | DIASTOLIC BLOOD PRESSURE: 97 MMHG | RESPIRATION RATE: 20 BRPM | HEIGHT: 68 IN | WEIGHT: 167 LBS | SYSTOLIC BLOOD PRESSURE: 169 MMHG

## 2025-04-30 DIAGNOSIS — Z98.890 S/P ARTHROSCOPY OF RIGHT SHOULDER: Primary | ICD-10-CM

## 2025-04-30 PROCEDURE — 99024 POSTOP FOLLOW-UP VISIT: CPT | Performed by: ORTHOPAEDIC SURGERY

## 2025-04-30 NOTE — TELEPHONE ENCOUNTER
Lm to return our call to [facility] physical therapy

## 2025-05-07 ENCOUNTER — EVALUATION (OUTPATIENT)
Dept: PHYSICAL THERAPY | Age: 65
End: 2025-05-07
Payer: MEDICARE

## 2025-05-07 DIAGNOSIS — Z98.890 S/P ARTHROSCOPY OF RIGHT SHOULDER: Primary | ICD-10-CM

## 2025-05-07 PROCEDURE — 97161 PT EVAL LOW COMPLEX 20 MIN: CPT | Performed by: PHYSICAL THERAPIST

## 2025-05-07 PROCEDURE — 97110 THERAPEUTIC EXERCISES: CPT | Performed by: PHYSICAL THERAPIST

## 2025-05-07 NOTE — PROGRESS NOTES
[facility]   Phone: [phone]   Fax: [phone]           Date:  2025   Patient: Carolann Gomes  : 1960  MRN: [id number]  Referring Provider: Raymond Galeas MD  [address] [facility]  [address]  [place] [address]     Medical Diagnosis:     Z98.890 (ICD-10-CM) - S/P arthroscopy of right shoulder       Surgery: Right shoulder arthroscopy, biceps tenodesis, rotator repair. subacromial decompression with acromioplasty  Date: 3/20/2025    SUBJECTIVE:     Onset date: 7 months     Mechanism of Injury / History:   Reports fell while mowing grass last July.    Patient is right handed.    Previous PT: none    Medical Management for Current Problem:  tylenol     Chief complaint: activity limitations    Pain:   Current: 1-2/10       Worst:5/10      Symptom Type/Quality: jabby sharp quick pain   Location:: anterior        Imaging results: No results found.    Past Medical History:  Past Medical History:   Diagnosis Date    GERD (gastroesophageal reflux disease)     H/O seasonal allergies     Hypertension     PONV (postoperative nausea and vomiting)     Right shoulder pain      Past Surgical History:   Procedure Laterality Date    BACK SURGERY      CARPAL TUNNEL RELEASE Right 2020     SECTION      CHOLECYSTECTOMY      FINGER SURGERY Right 2020    third finger    SHOULDER ARTHROSCOPY Right 3/20/2025    RIGHT SHOULDER ARTHROSCOPY ROTATOR CUFF REPAIR BICEPS TENODESIS WITH SUBSCAPULARIS REPAIR performed by Raymond Galeas MD at [facility] OR       Medications:   Current Outpatient Medications   Medication Sig Dispense Refill    atorvastatin (LIPITOR) 20 MG tablet Take 1 tablet by mouth daily 90 tablet 1    baclofen (LIORESAL) 10 MG tablet Take 1 tablet by mouth 2 times daily (Patient taking differently: Take 1 tablet by mouth nightly) 30 tablet 2    cloNIDine (CATAPRES) 0.2 MG tablet Take 1 tablet by mouth in the morning, at noon, and at bedtime 270 tablet 1    losartan

## 2025-05-07 NOTE — PROGRESS NOTES
[facility]   Phone: [phone]   Fax: [phone]      Physical Therapy Daily Treatment Note    Date: 2025  Patient Name: Carolann Gomes  : 1960   MRN: [id number]  DOInjury: last July   DOSx: 3/20/2025   Surgery: Right shoulder arthroscopy, biceps tenodesis, rotator repair. subacromial decompression with acromioplasty    Referring Provider: Raymond Galeas MD  [address] [facility]  [address] [place] [address]     Medical Diagnosis:   Z98.890 (ICD-10-CM) - S/P arthroscopy of right shoulder     Precautions:  protocol     Outcome Measure:  Quickdash 36.36%    S: See eval.  O: Pt given written HEP  Time 0792-6769     Visit  Repeat outcome measure at mid point and end.    Pain 1-2/10     ROM Joint/Motion:  Right Shoulder:     AROM: 100° Forward elevation,  40° ER,  IR to 60 (er/ir tested at approx 30* abd ) , abd 80*      PROM: 90° Forward elevation,  25° ER,  50° IR  (er/ir tested at approx 30 * abd) , abd 95      Left Shoulder:  AROM: 170° Forward elevation,  68° ER,  IR to 90, abd 165      Modalities            Manual                  Stretch      Table slides 10 x 10s holds  Flexion, scaption; hep  te   Wall Flexion      Wall ER stretch      Towel IR stretch      IR reaching behind back      Exercise      Shrugs AROM      Pendulum Ex      UBE      Pulleys - flex      Pulleys-IR      Supine wand chest press      Supine wand flex      Supine wand ER/IR      Supine flexion      S-lying ABD      S-lying ER      Standing wand flex      Standing flexion      Standing ABD            ROWS: H Functional activities  To aid in ROM and strength needed for reaching , lifting ,pushing and pulling at home/work    ROWS: M  \"    ROWS: L  \"    ER  \"    IR  \"                A:  Tolerated well.  Above added to written HEP.    P: Continue with rehab plan  Ritu Giang, PT DPT, PT [id number]    Treatment Charges: Mins Units   Initial Evaluation 27 1   Re-Evaluation     Ther

## 2025-05-12 ENCOUNTER — TREATMENT (OUTPATIENT)
Dept: PHYSICAL THERAPY | Age: 65
End: 2025-05-12
Payer: MEDICARE

## 2025-05-12 DIAGNOSIS — Z98.890 S/P ARTHROSCOPY OF RIGHT SHOULDER: Primary | ICD-10-CM

## 2025-05-12 PROCEDURE — 97110 THERAPEUTIC EXERCISES: CPT | Performed by: PHYSICAL THERAPIST

## 2025-05-12 NOTE — PROGRESS NOTES
[facility]   Phone: [phone]   Fax: [phone]      Physical Therapy Daily Treatment Note    Date: 2025  Patient Name: Carolann Gomes  : 1960   MRN: [id number]  DOInjury: last July   DOSx: 3/20/2025   Surgery: Right shoulder arthroscopy, biceps tenodesis, rotator repair. subacromial decompression with acromioplasty    Referring Provider:Raymond Galeas MD  [address] [facility]  [address]  [place] [address]     Medical Diagnosis:   Z98.890 (ICD-10-CM) - S/P arthroscopy of right shoulder     Precautions:  protocol     Outcome Measure:  Quickdash 36.36%    S: Pt reports only able to attend PT for 4 times total due to copay costs.  Pt reports no pain today.  Pt is 7 weeks postop.  Pt advised to call physician to inform doctor's office of pt's limited ability to attend PT.        O: Pt given written HEP  Time 1120- 1151     Visit  Repeat outcome measure at mid point and end.    Pain See above      ROM Joint/Motion:  Right Shoulder:     AROM: 100° Forward elevation,  40° ER,  IR to 60 (er/ir tested at approx 30* abd ) , abd 80*      PROM: 90° Forward elevation,  25° ER,  50° IR  (er/ir tested at approx 30 * abd) , abd 95      Left Shoulder:  AROM: 170° Forward elevation,  68° ER,  IR to 90, abd 165      Modalities      Ice  10 minutes   NC   Manual                  Stretch      Table slides 10 x 10s holds  Flexion, scaption; hep  te   Wall Flexion 10 x 10s holds   HEP     Wall ER stretch      Towel IR stretch      IR reaching behind back      Exercise      Shrugs AROM      Pendulum Ex      UBE      Pulleys - flex 20 x 10s holds       Scap retraction 2 x 10  HEP    Pulleys-IR      Supine wand chest press 2 x 10  HEP    Supine wand flex 2 x 10  HEP    Supine wand ER/IR 10 x 10s holds  HEP    Supine serratus punch  2 x 10   Hep    Supine flexion      S-lying ABD 2 x 10 x 5s holds HEP     S-lying ER 2 x 10   HEP     Standing wand flex      Standing flexion

## 2025-05-21 ENCOUNTER — OFFICE VISIT (OUTPATIENT)
Dept: FAMILY MEDICINE CLINIC | Age: 65
End: 2025-05-21
Payer: MEDICARE

## 2025-05-21 VITALS
SYSTOLIC BLOOD PRESSURE: 145 MMHG | BODY MASS INDEX: 24.4 KG/M2 | OXYGEN SATURATION: 99 % | TEMPERATURE: 97.3 F | HEART RATE: 79 BPM | HEIGHT: 68 IN | DIASTOLIC BLOOD PRESSURE: 92 MMHG | WEIGHT: 161 LBS

## 2025-05-21 DIAGNOSIS — Z00.00 INITIAL MEDICARE ANNUAL WELLNESS VISIT: Primary | ICD-10-CM

## 2025-05-21 DIAGNOSIS — I10 ESSENTIAL HYPERTENSION: ICD-10-CM

## 2025-05-21 PROCEDURE — G0438 PPPS, INITIAL VISIT: HCPCS | Performed by: STUDENT IN AN ORGANIZED HEALTH CARE EDUCATION/TRAINING PROGRAM

## 2025-05-21 PROCEDURE — 1123F ACP DISCUSS/DSCN MKR DOCD: CPT | Performed by: STUDENT IN AN ORGANIZED HEALTH CARE EDUCATION/TRAINING PROGRAM

## 2025-05-21 PROCEDURE — 3080F DIAST BP >= 90 MM HG: CPT | Performed by: STUDENT IN AN ORGANIZED HEALTH CARE EDUCATION/TRAINING PROGRAM

## 2025-05-21 PROCEDURE — 99213 OFFICE O/P EST LOW 20 MIN: CPT | Performed by: STUDENT IN AN ORGANIZED HEALTH CARE EDUCATION/TRAINING PROGRAM

## 2025-05-21 PROCEDURE — 1159F MED LIST DOCD IN RCRD: CPT | Performed by: STUDENT IN AN ORGANIZED HEALTH CARE EDUCATION/TRAINING PROGRAM

## 2025-05-21 PROCEDURE — 3077F SYST BP >= 140 MM HG: CPT | Performed by: STUDENT IN AN ORGANIZED HEALTH CARE EDUCATION/TRAINING PROGRAM

## 2025-05-21 ASSESSMENT — PATIENT HEALTH QUESTIONNAIRE - PHQ9
SUM OF ALL RESPONSES TO PHQ QUESTIONS 1-9: 0
2. FEELING DOWN, DEPRESSED OR HOPELESS: NOT AT ALL
SUM OF ALL RESPONSES TO PHQ QUESTIONS 1-9: 0
SUM OF ALL RESPONSES TO PHQ QUESTIONS 1-9: 0
1. LITTLE INTEREST OR PLEASURE IN DOING THINGS: NOT AT ALL
SUM OF ALL RESPONSES TO PHQ QUESTIONS 1-9: 0

## 2025-05-21 ASSESSMENT — VISUAL ACUITY
OD_CC: 20/30
OS_CC: 20/40

## 2025-05-21 ASSESSMENT — LIFESTYLE VARIABLES
HOW MANY STANDARD DRINKS CONTAINING ALCOHOL DO YOU HAVE ON A TYPICAL DAY: PATIENT DOES NOT DRINK
HOW OFTEN DO YOU HAVE A DRINK CONTAINING ALCOHOL: MONTHLY OR LESS

## 2025-05-21 NOTE — PATIENT INSTRUCTIONS
adapted under license by [employer]. If you have questions about a medical condition or this instruction, always ask your healthcare professional. [employer] disclaims any warranty or liability for your use of this information.    Personalized Preventive Plan for Carolann Gomes - 5/21/2025  Medicare offers a range of preventive health benefits. Some of the tests and screenings are paid in full while other may be subject to a deductible, co-insurance, and/or copay.  Some of these benefits include a comprehensive review of your medical history including lifestyle, illnesses that may run in your family, and various assessments and screenings as appropriate.  After reviewing your medical record and screening and assessments performed today your provider may have ordered immunizations, labs, imaging, and/or referrals for you.  A list of these orders (if applicable) as well as your Preventive Care list are included within your After Visit Summary for your review.      
not examined

## 2025-06-09 ENCOUNTER — TREATMENT (OUTPATIENT)
Dept: PHYSICAL THERAPY | Age: 65
End: 2025-06-09
Payer: MEDICARE

## 2025-06-09 DIAGNOSIS — Z98.890 S/P ARTHROSCOPY OF RIGHT SHOULDER: Primary | ICD-10-CM

## 2025-06-09 PROCEDURE — 97110 THERAPEUTIC EXERCISES: CPT | Performed by: PHYSICAL THERAPIST

## 2025-06-09 NOTE — PROGRESS NOTES
[facility]   Phone: [phone]   Fax: [phone]      Physical Therapy Daily Treatment Note    Date: 2025  Patient Name: Carolann oGmes  : 1960   MRN: [id number]  DOInjury: last July   DOSx: 3/20/2025   Surgery: Right shoulder arthroscopy, biceps tenodesis, rotator repair. subacromial decompression with acromioplasty    Referring Provider:Raymond Galeas MD  [address] [facility]  [address]  [place] [address]     Medical Diagnosis:   Z98.890 (ICD-10-CM) - S/P arthroscopy of right shoulder     Precautions:  protocol     Outcome Measure:  Quickdash 36.36%    S: Pt 11 weeks postop. Pt reports no pain today.  Reports has been compliant with HEP.          O:  Time 1120 - 1159     Visit 3/12 Repeat outcome measure at mid point and end.    Pain See above      ROM Joint/Motion:  Right Shoulder:     AROM: 145° Forward elevation,  60° ER,  IR to 60 (er/ir tested at approx 45* abd)  , abd 150*      PROM: 160° Forward elevation,  65° ER,  65° IR, abd 132*     Left Shoulder:  AROM: 170° Forward elevation,  68° ER,  IR to 90, abd 165      Modalities      Ice   NC   Manual                  Stretch      Table slides Flexion, scaption; hep  te   Wall Flexion 10 x 10s holds   HEP     Wall ER stretch      Towel IR stretch 10 x 10s holds   Added to HEP     Sleeper stretch for ER and for IR  10x 10s holds for each  Added to HEP     IR reaching behind back      Exercise      Shrugs AROM      Pendulum Ex      UBE      Pulleys - flex     Scap retraction 2 x 10  HEP    Pulleys-IR      Supine wand chest press 2 x 10  HEP    Supine wand flex 2 x 10  HEP    Supine wand ER/IR HEP    Supine serratus punch  Hep    Supine flexion      S-lying ABD 2 x 10 x 5s holds HEP     S-lying ER  x 10   HEP     Standing wand flex      Standing flexion  10x      Standing ABD  10x            ROWS: H Functional activities  To aid in ROM and strength needed for reaching , lifting ,pushing and pulling at

## 2025-06-11 ENCOUNTER — OFFICE VISIT (OUTPATIENT)
Dept: ORTHOPEDIC SURGERY | Age: 65
End: 2025-06-11

## 2025-06-11 VITALS
HEART RATE: 97 BPM | DIASTOLIC BLOOD PRESSURE: 118 MMHG | OXYGEN SATURATION: 100 % | SYSTOLIC BLOOD PRESSURE: 174 MMHG | TEMPERATURE: 99.1 F | HEIGHT: 68 IN | BODY MASS INDEX: 24.25 KG/M2 | WEIGHT: 160 LBS

## 2025-06-11 DIAGNOSIS — Z98.890 S/P RIGHT ROTATOR CUFF REPAIR: Primary | ICD-10-CM

## 2025-06-11 DIAGNOSIS — Z98.890 S/P ARTHROSCOPY OF RIGHT SHOULDER: ICD-10-CM

## 2025-06-11 PROCEDURE — 99024 POSTOP FOLLOW-UP VISIT: CPT | Performed by: ORTHOPAEDIC SURGERY

## 2025-06-11 NOTE — PROGRESS NOTES
[facility]  ORTHOPAEDICS AND SPORTS MEDICINE  DATE OF VISIT: 06/11/25  Follow Up Post Operative Visit     Follow Up Post Operative Visit     CHIEF COMPLAINT:   Chief Complaint   Patient presents with    Post-Op Check     Pt is here 3 months out from a right shoulder arthroscopy, biceps tenodesis, subscapularis repair, supraspinatus repair, subacromial decompression with acromioplasty.  Overall doing well.          Surgery: Right shoulder arthroscopy, biceps tenodesis, rotator repair. subacromial decompression with acromioplasty  Date: 3/20/2025    Subjective:    Carolann Gomes is here for follow up visit s/p above procedure.  She  is doing well.  She  has been compliant with postoperative care.  Reports symptoms have improved.  Denies pain at time of visit today. Patient working with outpatient PT at this time.    Controlled Substances Monitoring:        Physical Exam:    Height: 1.727 m (5' 8\"), Weight - Scale: 72.6 kg (160 lb), BP: (!) 174/118 (parking lot)    General: Alert and oriented x3, no acute distress  Cardiovascular/pulmonary: No labored breathing, peripheral perfusion intact  Musculoskeletal:    Right Upper Extremity  Skin is clean dry and intact  Incisions well healed  No edema noted  Radial pulse palpable, fingers warm with BCR  Flex/extension intact to wrist, thumb and fingers  AROM of shoulder 160/90/L1  Subjectively states sensation intact to radial/medial/ulnar distribution      Imaging: Reviewed    Assessment and Plan: Status post right shoulder arthroscopy, biceps tenodesis, rotator cuff repair, subacromial decompression with acromioplasty      Patient okay to transition to phase 3, strengthening phase of shoulder rehab protocol  OTC analgesics & RICE tx prn  Continue PT and HEP  No heavy lifting, pushing, or pulling  Patient okay to follow up in office in 3 months for reevaluation        Yanira MONTELONGO-CNP  Orthopedic Surgery   06/11/25  3:42 PM

## 2025-06-19 ENCOUNTER — TREATMENT (OUTPATIENT)
Dept: PHYSICAL THERAPY | Age: 65
End: 2025-06-19

## 2025-06-19 NOTE — PROGRESS NOTES
[facility]   Phone: [phone]   Fax: [phone]      Physical Therapy Daily Treatment Note    Date: 2025  Patient Name: Carolann Gomes  : 1960   MRN: [id number]  DOInjury: last July   DOSx: 3/20/2025   Surgery: Right shoulder arthroscopy, biceps tenodesis, rotator repair. subacromial decompression with acromioplasty    Referring Provider:Raymond Galeas MD  [address] [facility]  [address]  [place] [address]     Medical Diagnosis:   Z98.890 (ICD-10-CM) - S/P arthroscopy of right shoulder     Precautions:  protocol     Outcome Measure:  Quickdash 36.36%    S: Pt 11 weeks postop. Pt reports no pain today.  Reports has been compliant with HEP.          O:  Time 1120 - 1159     Visit 3/12 Repeat outcome measure at mid point and end.    Pain See above      ROM Joint/Motion:  Right Shoulder:     AROM: 145° Forward elevation,  60° ER,  IR to 60 (er/ir tested at approx 45* abd)  , abd 150*      PROM: 160° Forward elevation,  65° ER,  65° IR, abd 132*     Left Shoulder:  AROM: 170° Forward elevation,  68° ER,  IR to 90, abd 165      Modalities      Ice   NC   Manual                  Stretch      Table slides Flexion, scaption; hep  te   Wall Flexion 10 x 10s holds   HEP     Wall ER stretch      Towel IR stretch 10 x 10s holds   Added to HEP     Sleeper stretch for ER and for IR  10x 10s holds for each  Added to HEP     IR reaching behind back      Exercise      Shrugs AROM      Pendulum Ex      UBE      Pulleys - flex     Scap retraction 2 x 10  HEP    Pulleys-IR      Supine wand chest press 2 x 10  HEP    Supine wand flex 2 x 10  HEP    Supine wand ER/IR HEP    Supine serratus punch  Hep    Supine flexion      S-lying ABD 2 x 10 x 5s holds HEP     S-lying ER  x 10   HEP     Standing wand flex      Standing flexion  10x      Standing ABD  10x            ROWS: H Functional activities  To aid in ROM and strength needed for reaching , lifting ,pushing and pulling at 
   [facility]   Phone: [phone]   Fax: [phone]      Discharge Summary        REASON FOR DISCHARGE: pt self discharging.       RECOMMENDATIONS: call c questions or if additional services are warranted.      Thank you for the opportunity to work with your patient. If you have questions or comments, please contact me at numbers listed above.      Ritu Giang, PT PT , DPT PT [id number] 6/19/2025     
arm []+ / [] -  [] ER lag []+ / [] -  [] Painful Arc []+ / [] -  [] Bear Hug []+ / [] -  [] Belly Press/ lift off[]+ / [] -  [] Other: []+ / [] -                  Long Term goals (6 weeks)  Decrease reported pain to 0-2/10  Increase ROM to AROM: 150° Forward elevation,  70° ER,  IR to 70 , abd 140*   Increase Strength to at least 3+/5    Able to perform/complete the following functions/tasks: pt able to reach OH 10x with no to min pain/limitation.  Pt able to reach behind back to lower T spine with no to min pain/limitation.  Pt able to bathe and dress with no to min pain/limitation.   QuickDASH 20% disability  Independent with Home Exercise Programs    OUTCOME MEASURE:  QuickDASH (Disorders of the Arm, Shoulder, and Hand) 36.36% disability     COMMENTS AND RECOMMENDATIONS:   ***    Thank you for the opportunity to work with your patient.     Ritu Giang, PT    I CERTIFY THAT THE ABOVE REASSESSMENT AND PLAN OF CARE FOR PHYSICAL THERAPY SERVICES ARE APPROPRIATE AND MEDICALLY NECESSARY.    Duration: From 6/19/2025 thru ***    ________________________                _______________  Physician     Date

## 2025-06-25 NOTE — PROGRESS NOTES
[facility] PRE-ADMISSION TESTING INSTRUCTIONS    Surgery Date 6-25-25  Arrival Time 0700    The Preadmission Testing patient is instructed accordingly using the following criteria (check applicable):    ARRIVAL INSTRUCTIONS:  [x] Parking the day of Surgery is located in the Main Entrance lot.  Upon entering the door, make an immediate right to the surgery reception desk    [x] Bring photo ID and insurance card    [] Bring in a copy of Living will or Durable Power of  papers.    [x] Please be sure to arrange for responsible adult to provide transportation to and from the hospital    [x] Please arrange for responsible adult to be with you for the 24 hour period post procedure due to having anesthesia    [x] If you awake am of surgery not feeling well or have temperature >100 please call [phone]    GENERAL INSTRUCTIONS:    [x] May have up to 8 ounces of WATER ONLY until 4 hours prior to surgery. No gum, candy or mints.    [x] You may brush your teeth, but do not swallow any water    [x] Take medications as instructed with 1-2 oz of water    [x] Stop herbal supplements and vitamins 5 days prior to procedure    [x] Follow preop dosing of blood thinners per physician instructions    [] Take 1/2 dose of evening insulin, but no insulin after midnight    [] No oral diabetic medications after midnight    [] If diabetic and have low blood sugar or feel symptomatic, take 1-2oz apple juice only    [] Bring inhalers day of surgery    [] Bring C-PAP/ Bi-Pap day of surgery    [] Bring urine specimen day of surgery    [x] Shower or bath with soap, lather and rinse well, AM of Surgery, no lotion, powders or creams to surgical site    [x] Follow bowel prep as instructed per surgeon    [x] No tobacco products within 24 hours of surgery     [x] No alcohol or illegal drug use within 24 hours of surgery.    [x] Jewelry, body piercing's, eyeglasses, contact lenses and dentures are not permitted into

## 2025-06-27 ENCOUNTER — ANESTHESIA EVENT (OUTPATIENT)
Dept: ENDOSCOPY | Age: 65
End: 2025-06-27
Payer: MEDICARE

## 2025-06-27 NOTE — ANESTHESIA PRE PROCEDURE
Problem List   Diagnosis Code    Other chest pain R07.89    GERD (gastroesophageal reflux disease) K21.9    Hypercholesterolemia E78.00    Essential hypertension I10    Vitamin D deficiency E55.9    Prediabetes R73.03    Right rotator cuff tear M75.101    Hx of colonic polyp Z86.0100       Past Medical History:        Diagnosis Date    GERD (gastroesophageal reflux disease)     H/O seasonal allergies     Hypertension     PONV (postoperative nausea and vomiting)     Screening for colon cancer        Past Surgical History:        Procedure Laterality Date    BACK SURGERY      CARPAL TUNNEL RELEASE Right 2020     SECTION      CHOLECYSTECTOMY      FINGER SURGERY Right 2020    third finger    SHOULDER ARTHROSCOPY Right 3/20/2025    RIGHT SHOULDER ARTHROSCOPY ROTATOR CUFF REPAIR BICEPS TENODESIS WITH SUBSCAPULARIS REPAIR performed by Raymond Galeas MD at [facility] OR       Social History:    Social History     Tobacco Use    Smoking status: Former     Current packs/day: 0.00     Average packs/day: 3.0 packs/day for 20.0 years (60.0 ttl pk-yrs)     Types: Cigarettes     Start date:      Quit date:      Years since quittin.5    Smokeless tobacco: Never   Substance Use Topics    Alcohol use: Not Currently     Comment: Quit 2024                                Counseling given: Not Answered      Vital Signs (Current):   Vitals:    25 0708 25 0712   BP: (!) 162/81    Pulse: 85    Resp: 16    Temp: 98 °F (36.7 °C)    TempSrc: Temporal    SpO2:  96%   Weight: 72.6 kg (160 lb)    Height: 1.727 m (5' 8\")                                               BP Readings from Last 3 Encounters:   25 (!) 162/81   25 (!) 174/118   25 (!) 145/92       NPO Status: Time of last liquid consumption:                         Time of last solid consumption:                         Date of last liquid consumption: 25                        Date of last solid food consumption:

## 2025-06-30 ENCOUNTER — ANESTHESIA (OUTPATIENT)
Dept: ENDOSCOPY | Age: 65
End: 2025-06-30
Payer: MEDICARE

## 2025-06-30 ENCOUNTER — HOSPITAL ENCOUNTER (OUTPATIENT)
Age: 65
Setting detail: OUTPATIENT SURGERY
Discharge: HOME | End: 2025-06-30
Attending: SURGERY | Admitting: SURGERY
Payer: MEDICARE

## 2025-06-30 VITALS
TEMPERATURE: 98 F | SYSTOLIC BLOOD PRESSURE: 165 MMHG | HEIGHT: 68 IN | HEART RATE: 111 BPM | RESPIRATION RATE: 20 BRPM | OXYGEN SATURATION: 97 % | BODY MASS INDEX: 24.25 KG/M2 | DIASTOLIC BLOOD PRESSURE: 86 MMHG | WEIGHT: 160 LBS

## 2025-06-30 DIAGNOSIS — Z86.0100 HX OF COLONIC POLYP: ICD-10-CM

## 2025-06-30 PROCEDURE — 7100000011 HC PHASE II RECOVERY - ADDTL 15 MIN: Performed by: SURGERY

## 2025-06-30 PROCEDURE — 2709999900 HC NON-CHARGEABLE SUPPLY: Performed by: SURGERY

## 2025-06-30 PROCEDURE — 3700000001 HC ADD 15 MINUTES (ANESTHESIA): Performed by: SURGERY

## 2025-06-30 PROCEDURE — 88305 TISSUE EXAM BY PATHOLOGIST: CPT

## 2025-06-30 PROCEDURE — 45380 COLONOSCOPY AND BIOPSY: CPT | Performed by: SURGERY

## 2025-06-30 PROCEDURE — 2580000003 HC RX 258

## 2025-06-30 PROCEDURE — 45385 COLONOSCOPY W/LESION REMOVAL: CPT | Performed by: SURGERY

## 2025-06-30 PROCEDURE — 3609010600 HC COLONOSCOPY POLYPECTOMY SNARE/COLD BIOPSY: Performed by: SURGERY

## 2025-06-30 PROCEDURE — 6360000002 HC RX W HCPCS

## 2025-06-30 PROCEDURE — 7100000010 HC PHASE II RECOVERY - FIRST 15 MIN: Performed by: SURGERY

## 2025-06-30 PROCEDURE — 3700000000 HC ANESTHESIA ATTENDED CARE: Performed by: SURGERY

## 2025-06-30 RX ORDER — HYDRALAZINE HYDROCHLORIDE 20 MG/ML
INJECTION INTRAMUSCULAR; INTRAVENOUS
Status: DISCONTINUED | OUTPATIENT
Start: 2025-06-30 | End: 2025-06-30 | Stop reason: SDUPTHER

## 2025-06-30 RX ORDER — PROPOFOL 10 MG/ML
INJECTION, EMULSION INTRAVENOUS
Status: DISCONTINUED | OUTPATIENT
Start: 2025-06-30 | End: 2025-06-30 | Stop reason: SDUPTHER

## 2025-06-30 RX ORDER — GLYCOPYRROLATE 0.2 MG/ML
INJECTION INTRAMUSCULAR; INTRAVENOUS
Status: DISCONTINUED | OUTPATIENT
Start: 2025-06-30 | End: 2025-06-30 | Stop reason: SDUPTHER

## 2025-06-30 RX ORDER — SODIUM CHLORIDE 9 MG/ML
INJECTION, SOLUTION INTRAVENOUS
Status: DISCONTINUED | OUTPATIENT
Start: 2025-06-30 | End: 2025-06-30 | Stop reason: SDUPTHER

## 2025-06-30 RX ADMIN — SODIUM CHLORIDE: 9 INJECTION, SOLUTION INTRAVENOUS at 08:17

## 2025-06-30 RX ADMIN — HYDRALAZINE HYDROCHLORIDE 5 MG: 20 INJECTION, SOLUTION INTRAMUSCULAR; INTRAVENOUS at 08:56

## 2025-06-30 RX ADMIN — HYDRALAZINE HYDROCHLORIDE 5 MG: 20 INJECTION, SOLUTION INTRAMUSCULAR; INTRAVENOUS at 08:41

## 2025-06-30 RX ADMIN — PROPOFOL 480 MG: 10 INJECTION, EMULSION INTRAVENOUS at 08:27

## 2025-06-30 RX ADMIN — HYDRALAZINE HYDROCHLORIDE 5 MG: 20 INJECTION, SOLUTION INTRAMUSCULAR; INTRAVENOUS at 08:38

## 2025-06-30 RX ADMIN — GLYCOPYRROLATE 0.2 MG: 0.2 INJECTION INTRAMUSCULAR; INTRAVENOUS at 08:35

## 2025-06-30 ASSESSMENT — PAIN - FUNCTIONAL ASSESSMENT
PAIN_FUNCTIONAL_ASSESSMENT: 0-10
PAIN_FUNCTIONAL_ASSESSMENT: 0-10

## 2025-06-30 NOTE — H&P
Expand All Collapse All[]Expand All by Default         [facility] Surgery Clinic Note     Assessment & Plan  1. Screening colonoscopy.  A comprehensive discussion was held regarding the potential risks associated with the procedure, including the possibility of bleeding, perforation, and the need for surgical intervention in rare cases. She was informed that any polyps found would be removed and biopsied if necessary.      PROCEDURE  The patient underwent a colonoscopy in , which revealed the presence of polyps.     Return for Colonoscopy.     Carolann was seen today for new patient.     Diagnoses and all orders for this visit:     History of colon polyps                   Chief Complaint   Patient presents with    New Patient       Colon screen (Ref Dr Layne)            PCP: Allan Layne MD  CC: Allan Layne MD      Carolann Gomes is a 64 y.o. female.     History of Present Illness  The patient presents for a colonoscopy.     She was referred for a colonoscopy, having not undergone the procedure since . She reports no current issues with bowel movements and has not observed any blood in her stool. A stool test has not been conducted. She is not experiencing abdominal pain or unexplained weight loss. Her medical history includes cholecystectomy and four  sections. Her previous colonoscopy revealed the presence of polyps.     FAMILY HISTORY  She does not have a family history of Crohn's disease, ulcerative colitis, or colon cancer.         Results        Past Medical History        Past Medical History:   Diagnosis Date    GERD (gastroesophageal reflux disease)      Hypertension              Past Surgical History         Past Surgical History:   Procedure Laterality Date    BACK SURGERY         SECTION        CHOLECYSTECTOMY                Home Medications           Prior to Admission medications    Medication Sig Start Date End Date Taking? Authorizing Provider

## 2025-06-30 NOTE — ANESTHESIA POSTPROCEDURE EVALUATION
Department of Anesthesiology  Postprocedure Note    Patient: Carolann Gomes  MRN: [id number]  YOB: 1960  Date of evaluation: 6/30/2025    Procedure Summary       Date: 06/30/25 Room / Location: [place] [address] / [facility]    Anesthesia Start: 0817 Anesthesia Stop: 0857    Procedures:       COLONOSCOPY BIOPSY      COLONOSCOPY POLYPECTOMY SNARE/BIOPSY Diagnosis:       Hx of colonic polyp      (Hx of colonic polyp [Z86.0100])    Surgeons: Swapnil Jones MD Responsible Provider: Johanna Orozco DO    Anesthesia Type: MAC ASA Status: 2            Anesthesia Type: MAC    Naomi Phase I: Naomi Score: 10    Naomi Phase II: Naomi Score: 10    Anesthesia Post Evaluation    Patient location during evaluation: PACU  Patient participation: complete - patient participated  Level of consciousness: awake and alert  Nausea & Vomiting: no vomiting and no nausea  Cardiovascular status: hemodynamically stable  Respiratory status: acceptable and spontaneous ventilation  Hydration status: stable  Pain management: adequate    No notable events documented.

## 2025-07-02 LAB — SURGICAL PATHOLOGY REPORT: NORMAL

## 2025-07-14 DIAGNOSIS — I10 ESSENTIAL HYPERTENSION: ICD-10-CM

## 2025-07-14 NOTE — TELEPHONE ENCOUNTER
Name of Medication(s) Requested:  Requested Prescriptions     Pending Prescriptions Disp Refills    losartan (COZAAR) 100 MG tablet 30 tablet 5     Sig: Take 1 tablet by mouth daily       Medication is on current medication list Yes    Dosage and directions were verified? Yes    Quantity verified: 90 day supply     Pharmacy Verified?  Yes    Last Appointment:  5/21/2025    Future appts:  Future Appointments   Date Time Provider Department Center   7/16/2025  9:30 AM Swapnil Jones MD COL SURG [facility]   8/20/2025 10:30 AM Allan Layne MD [facility] DEP        (If no appt send self scheduling link. .REFILLAPPT)  Scheduling request sent?     [] Yes  [x] No    Does patient need updated?  [] Yes  [x] No

## 2025-07-15 RX ORDER — LOSARTAN POTASSIUM 100 MG/1
100 TABLET ORAL EVERY EVENING
Qty: 30 TABLET | Refills: 5 | Status: SHIPPED | OUTPATIENT
Start: 2025-07-15 | End: 2026-01-11

## 2025-07-16 ENCOUNTER — OFFICE VISIT (OUTPATIENT)
Dept: SURGERY | Age: 65
End: 2025-07-16
Payer: MEDICARE

## 2025-07-16 VITALS
DIASTOLIC BLOOD PRESSURE: 76 MMHG | HEART RATE: 76 BPM | BODY MASS INDEX: 24.1 KG/M2 | RESPIRATION RATE: 18 BRPM | SYSTOLIC BLOOD PRESSURE: 124 MMHG | HEIGHT: 68 IN | OXYGEN SATURATION: 96 % | WEIGHT: 159 LBS

## 2025-07-16 DIAGNOSIS — K57.30 DIVERTICULOSIS OF LARGE INTESTINE WITHOUT HEMORRHAGE: ICD-10-CM

## 2025-07-16 DIAGNOSIS — K64.9 HEMORRHOIDS, UNSPECIFIED HEMORRHOID TYPE: ICD-10-CM

## 2025-07-16 DIAGNOSIS — K63.5 COLORECTAL POLYP DETECTED ON COLONOSCOPY: Primary | ICD-10-CM

## 2025-07-16 PROCEDURE — 99213 OFFICE O/P EST LOW 20 MIN: CPT | Performed by: SURGERY

## 2025-07-16 PROCEDURE — 1123F ACP DISCUSS/DSCN MKR DOCD: CPT | Performed by: SURGERY

## 2025-07-16 PROCEDURE — 3078F DIAST BP <80 MM HG: CPT | Performed by: SURGERY

## 2025-07-16 PROCEDURE — 1159F MED LIST DOCD IN RCRD: CPT | Performed by: SURGERY

## 2025-07-16 PROCEDURE — 3074F SYST BP LT 130 MM HG: CPT | Performed by: SURGERY

## 2025-07-19 NOTE — PROGRESS NOTES
[facility] Surgery Clinic Note    Assessment & Plan  1. Diverticulosis.  Colonoscopy showed diverticulosis and chronic diverticular changes. A high-fiber diet and supplementation with Metamucil or Benefiber as needed are recommended to keep the bowels regular and avoid straining. Over-the-counter medications can be used as needed for symptomatic control.    2. Grade 2 hemorrhoids.  Grade 2 hemorrhoids were noted. A high-fiber diet and regular bowel movements are recommended to manage symptoms. Over-the-counter medications can be used as needed for symptomatic control.    3. Tubular adenomas.  Multiple polyps were removed, and pathology showed these to be tubular adenomas, which are precancerous polyps that could turn into cancer if not monitored. A full repeat colonoscopy is recommended in 3 years.    4. Tubulovillous adenoma.  An area in the anal canal was removed and identified as a tubulovillous adenoma, which is a precancerous polyp. A flexible sigmoidoscopy is recommended in about 6 months to evaluate the anal canal area given the size and location of the polyp.     Risks, benefits, and alternatives of the recommended treatments were discussed. The patient was advised that maintaining a high-fiber diet and using fiber supplements like Metamucil or Benefiber can help prevent complications such as diverticulitis and manage hemorrhoid symptoms. Over-the-counter medications can be used for symptomatic control. The importance of follow-up procedures, including a full colonoscopy in 3 years and a flexible sigmoidoscopy in 6 months, was emphasized to monitor and prevent the progression of precancerous polyps to cancer. The patient was informed that the flexible sigmoidoscopy requires minimal preparation, involving only two enemas.    Return in about 6 months (around 1/16/2026) for Endoscopy.    Carolann was seen today for follow-up.    Diagnoses and all orders for this visit:    Colorectal polyp detected on

## 2025-08-11 ENCOUNTER — OFFICE VISIT (OUTPATIENT)
Dept: FAMILY MEDICINE CLINIC | Age: 65
End: 2025-08-11

## 2025-08-11 VITALS
BODY MASS INDEX: 23.95 KG/M2 | WEIGHT: 158 LBS | RESPIRATION RATE: 16 BRPM | OXYGEN SATURATION: 99 % | HEIGHT: 68 IN | TEMPERATURE: 98 F | SYSTOLIC BLOOD PRESSURE: 130 MMHG | DIASTOLIC BLOOD PRESSURE: 90 MMHG | HEART RATE: 84 BPM

## 2025-08-11 DIAGNOSIS — R30.0 DYSURIA: Primary | ICD-10-CM

## 2025-08-11 LAB
BILIRUBIN, POC: NORMAL
BLOOD URINE, POC: NORMAL
CLARITY, POC: NORMAL
COLOR, POC: YELLOW
GLUCOSE URINE, POC: NORMAL MG/DL
KETONES, POC: NORMAL MG/DL
LEUKOCYTE EST, POC: NORMAL
NITRITE, POC: NORMAL
PH, POC: 5.5
PROTEIN, POC: NORMAL MG/DL
SPECIFIC GRAVITY, POC: 1.02
UROBILINOGEN, POC: 0.2 MG/DL

## 2025-08-11 RX ORDER — NITROFURANTOIN 25; 75 MG/1; MG/1
100 CAPSULE ORAL 2 TIMES DAILY
Qty: 10 CAPSULE | Refills: 0 | Status: SHIPPED | OUTPATIENT
Start: 2025-08-11 | End: 2025-08-16

## 2025-08-11 ASSESSMENT — ENCOUNTER SYMPTOMS
VOMITING: 0
NAUSEA: 0
COUGH: 0
RHINORRHEA: 0
SHORTNESS OF BREATH: 0
ABDOMINAL PAIN: 0

## 2025-08-13 LAB
CULTURE: ABNORMAL
SPECIMEN DESCRIPTION: ABNORMAL

## 2025-08-16 DIAGNOSIS — E78.2 MIXED HYPERLIPIDEMIA: ICD-10-CM

## 2025-08-18 RX ORDER — ATORVASTATIN CALCIUM 20 MG/1
20 TABLET, FILM COATED ORAL DAILY
Qty: 90 TABLET | Refills: 1 | Status: SHIPPED | OUTPATIENT
Start: 2025-08-18 | End: 2025-08-20 | Stop reason: SDUPTHER

## 2025-08-20 ENCOUNTER — OFFICE VISIT (OUTPATIENT)
Dept: FAMILY MEDICINE CLINIC | Age: 65
End: 2025-08-20

## 2025-08-20 VITALS
HEIGHT: 68 IN | WEIGHT: 159 LBS | OXYGEN SATURATION: 98 % | TEMPERATURE: 97.8 F | SYSTOLIC BLOOD PRESSURE: 138 MMHG | DIASTOLIC BLOOD PRESSURE: 78 MMHG | HEART RATE: 85 BPM | BODY MASS INDEX: 24.1 KG/M2

## 2025-08-20 DIAGNOSIS — R73.03 PREDIABETES: ICD-10-CM

## 2025-08-20 DIAGNOSIS — E78.2 MIXED HYPERLIPIDEMIA: ICD-10-CM

## 2025-08-20 DIAGNOSIS — I10 ESSENTIAL HYPERTENSION: ICD-10-CM

## 2025-08-20 DIAGNOSIS — K21.9 GASTROESOPHAGEAL REFLUX DISEASE WITHOUT ESOPHAGITIS: Primary | Chronic | ICD-10-CM

## 2025-08-20 DIAGNOSIS — K21.9 GASTROESOPHAGEAL REFLUX DISEASE WITHOUT ESOPHAGITIS: Chronic | ICD-10-CM

## 2025-08-20 LAB
ANION GAP SERPL CALCULATED.3IONS-SCNC: 14 MMOL/L (ref 7–16)
BUN BLDV-MCNC: 11 MG/DL (ref 8–23)
CALCIUM SERPL-MCNC: 9.6 MG/DL (ref 8.8–10.2)
CHLORIDE BLD-SCNC: 105 MMOL/L (ref 98–107)
CHOLESTEROL, TOTAL: 132 MG/DL
CO2: 23 MMOL/L (ref 22–29)
CREAT SERPL-MCNC: 0.7 MG/DL (ref 0.5–1)
GFR, ESTIMATED: >90 ML/MIN/1.73M2
GLUCOSE BLD-MCNC: 124 MG/DL (ref 74–99)
HBA1C MFR BLD: 6.4 % (ref 4–5.6)
HCT VFR BLD CALC: 38.6 % (ref 34–48)
HDLC SERPL-MCNC: 54 MG/DL
HEMOGLOBIN: 12.5 G/DL (ref 11.5–15.5)
LDL CHOLESTEROL: 47 MG/DL
MCH RBC QN AUTO: 32.4 PG (ref 26–35)
MCHC RBC AUTO-ENTMCNC: 32.4 G/DL (ref 32–34.5)
MCV RBC AUTO: 100 FL (ref 80–99.9)
PDW BLD-RTO: 12.5 % (ref 11.5–15)
PLATELET # BLD: 267 K/UL (ref 130–450)
PMV BLD AUTO: 12.2 FL (ref 7–12)
POTASSIUM SERPL-SCNC: 4.4 MMOL/L (ref 3.5–5.1)
RBC # BLD: 3.86 M/UL (ref 3.5–5.5)
SODIUM BLD-SCNC: 142 MMOL/L (ref 136–145)
TRIGL SERPL-MCNC: 152 MG/DL
VLDLC SERPL CALC-MCNC: 30 MG/DL
WBC # BLD: 7.4 K/UL (ref 4.5–11.5)

## 2025-08-20 RX ORDER — ATORVASTATIN CALCIUM 20 MG/1
20 TABLET, FILM COATED ORAL DAILY
Qty: 90 TABLET | Refills: 2 | Status: SHIPPED | OUTPATIENT
Start: 2025-08-20

## 2025-08-20 RX ORDER — OMEPRAZOLE 40 MG/1
40 CAPSULE, DELAYED RELEASE ORAL DAILY
Qty: 30 CAPSULE | Refills: 5
Start: 2025-08-20

## 2025-08-21 ENCOUNTER — RESULTS FOLLOW-UP (OUTPATIENT)
Dept: PRIMARY CARE CLINIC | Age: 65
End: 2025-08-21

## 2025-09-07 ENCOUNTER — TELEPHONE (OUTPATIENT)
Dept: SURGERY | Age: 65
End: 2025-09-07

## 2025-09-07 PROBLEM — K63.5 COLORECTAL POLYP DETECTED ON COLONOSCOPY: Status: ACTIVE | Noted: 2025-09-07

## 2025-09-07 PROBLEM — K57.30 DIVERTICULOSIS OF COLON: Status: ACTIVE | Noted: 2025-09-07

## 2025-09-07 PROBLEM — K64.9 HEMORRHOID: Status: ACTIVE | Noted: 2025-09-07

## (undated) DEVICE — GLOVE ORTHO 8   MSG9480

## (undated) DEVICE — 3M™ STERI-DRAPE™ U-DRAPE 1015: Brand: STERI-DRAPE™

## (undated) DEVICE — PACK,SHOULDER,DRAPE,POUCH: Brand: MEDLINE

## (undated) DEVICE — GLOVE,SURG,SIGNATURE LTX MICR,LTX,PF,7.0: Brand: MEDLINE

## (undated) DEVICE — CANNULA ARTHSCP L7CM DIA7MM TRNSLUC THRD FLX W/ NO SQUIRT

## (undated) DEVICE — COVER,BOOT,FOAM,NON-SKID,HOOK-LOOP,XLG: Brand: MEDLINE INDUSTRIES, INC.

## (undated) DEVICE — ALCOHOL RUBBING ISO 16OZ 70%

## (undated) DEVICE — SOLUTION IRRIG 3000ML LAC RINGERS ARTHROMTC PLAS CONT

## (undated) DEVICE — TUBING PMP L16FT MAIN DISP FOR AR-6400 AR-6475 Â€“ ORDER MULTIPLES OF 10 EACH

## (undated) DEVICE — NEEDLE HYPO 18GA L1.5IN PNK POLYPR HUB S STL REG BVL STR

## (undated) DEVICE — DRESSING,GAUZE,XEROFORM,CURAD,1"X8",ST: Brand: CURAD

## (undated) DEVICE — 3M™ IOBAN™ 2 ANTIMICROBIAL INCISE DRAPE 6640EZ: Brand: IOBAN™ 2

## (undated) DEVICE — PAD,ABDOMINAL,5"X9",ST,LF,25/BX: Brand: MEDLINE INDUSTRIES, INC.

## (undated) DEVICE — NEEDLE SCORPION HD MEGA LOADER

## (undated) DEVICE — TUBING, SUCTION, 9/32" X 10', STRAIGHT: Brand: MEDLINE

## (undated) DEVICE — SPONGE GZ W4XL4IN RAYON POLY CVR W/NONWOVEN FAB STRL 2/PK

## (undated) DEVICE — STRIP,CLOSURE,WOUND,MEDI-STRIP,1/2X4: Brand: MEDLINE

## (undated) DEVICE — GAUZE,SPONGE,4"X4",8PLY,STRL,LF,10/TRAY: Brand: MEDLINE

## (undated) DEVICE — DRAPE,REIN 53X77,STERILE: Brand: MEDLINE

## (undated) DEVICE — GOWN,SIRUS,POLYRNF,BRTHSLV,XLN/XL,20/CS: Brand: MEDLINE

## (undated) DEVICE — 2958 MEDIPORE PRE-CUT DSG CVR 20CMX28CM: Brand: 3M COMPANY

## (undated) DEVICE — KIT SURG W7XL11IN 2 PKT UNTREATED NA

## (undated) DEVICE — SNARE ENDOSCP L240CM LOOP W13MM SHTH DIA2.4MM SM OVL FLX

## (undated) DEVICE — FORCEPS BX L240CM JAW DIA2.4MM ORNG L CAP W/ NDL DISP RAD

## (undated) DEVICE — 1000 S-DRAPE TOWEL DRAPE 10/BX: Brand: STERI-DRAPE™

## (undated) DEVICE — Device

## (undated) DEVICE — GLOVE SURG SZ 8 CRM LTX FREE POLYISOPRENE POLYMER BEAD ANTI

## (undated) DEVICE — GRADUATE TRIANG MEASURE 1000ML BLK PRNT

## (undated) DEVICE — TOWEL,OR,DSP,ST,BLUE,STD,6/PK,12PK/CS: Brand: MEDLINE

## (undated) DEVICE — NEEDLE SPNL L3.5IN PNK HUB S STL REG WALL FIT STYL W/ QNCKE

## (undated) DEVICE — ABLATOR ENDOSCOPIC ELECTROCAUTERY 90 DEG RF ASPIRATING STERILE DISPOSABLE APOLLORF I90

## (undated) DEVICE — DRAPE,U/ SHT,SPLIT,PLAS,STERIL: Brand: MEDLINE

## (undated) DEVICE — CANNULA ARTHSCP L5CM ID8MM DBL DAM 1 PC MOLD LO PROF FLNG

## (undated) DEVICE — SLEEVE TRAC SPANDEX LAT W/ 4IN COBAN SUPERFICIAL RAD NRV PD

## (undated) DEVICE — 4-PORT MANIFOLD: Brand: NEPTUNE 2

## (undated) DEVICE — GOWN,SIRUS,FABRNF,XL,20/CS: Brand: MEDLINE

## (undated) DEVICE — APPLICATOR MEDICATED 26 CC SOLUTION HI LT ORNG CHLORAPREP

## (undated) DEVICE — TRAP POLYP ETRAP

## (undated) DEVICE — 3M™ COBAN™ NL STERILE NON-LATEX SELF-ADHERENT WRAP, 2084S, 4 IN X 5 YD (10 CM X 4,5 M), 18 ROLLS/CASE: Brand: 3M™ COBAN™

## (undated) DEVICE — SYRINGE MED 30ML STD CLR PLAS LUERLOCK TIP N CTRL DISP

## (undated) DEVICE — BLADE,STAINLESS-STEEL,11,STRL,DISPOSABLE: Brand: MEDLINE

## (undated) DEVICE — BLADE SHV L13CM DIA4MM DBL CUT COOLCUT

## (undated) DEVICE — SUTURE SUTTAPE FIBERLINK 1.3MM WHT BLU CLS LOOP AR7535

## (undated) DEVICE — DOUBLE BASIN SET: Brand: MEDLINE INDUSTRIES, INC.